# Patient Record
Sex: MALE | NOT HISPANIC OR LATINO | Employment: FULL TIME | URBAN - METROPOLITAN AREA
[De-identification: names, ages, dates, MRNs, and addresses within clinical notes are randomized per-mention and may not be internally consistent; named-entity substitution may affect disease eponyms.]

---

## 2024-01-09 DIAGNOSIS — K75.81 LIVER CIRRHOSIS SECONDARY TO NASH (H): ICD-10-CM

## 2024-01-09 DIAGNOSIS — R16.0 LIVER MASS: Primary | ICD-10-CM

## 2024-01-09 DIAGNOSIS — K74.60 LIVER CIRRHOSIS SECONDARY TO NASH (H): ICD-10-CM

## 2024-01-09 NOTE — CONFIDENTIAL NOTE
DIAGNOSIS:  Liver Mass   Appt Date:  01.16.2024   NOTES STATUS DETAILS   OFFICE NOTE from referring provider     OFFICE NOTES from other specialists Care Everywhere 05.04.2023 Tyson Rucker MD [facility]    01.30.2023  Eliazar Seaman MD [facility]   DISCHARGE SUMMARY from hospital     MEDICATION LIST Care Everywhere    LIVER BIOSPY (IF APPLICABLE)      PATHOLOGY REPORTS      IMAGING     ENDOSCOPY (IF AVAILABLE)     COLONOSCOPY (IF AVAILABLE)     ULTRASOUND LIVER     CT OF ABDOMEN     MRI OF LIVER Care Everywhere 01.08.2024, 07.10.2023, 04.11.2023 MR Abd W/WO    04.11.2023 MR Liver Limited    FIBROSCAN, US ELASTOGRAPHY, FIBROSIS SCAN, MR ELASTOGRAPHY Care Everywhere  10.03.2022 US Elastography   LABS     HEPATIC PANEL (LIVER PANEL) Care Everywhere 05.04.2023   BASIC METABOLIC PANEL Care Everywhere 05.04.2023   COMPLETE METABOLIC PANEL Care Everywhere 01.30.2023   COMPLETE BLOOD COUNT (CBC) Care Everywhere 01.30.2023   INTERNATIONAL NORMALIZED RATIO (INR) Care Everywhere 05.04.2023   HEPATITIS C ANTIBODY Care Everywhere 09.26.2022   HEPATITIS C VIRAL LOAD/PCR     HEPATITIS C GENOTYPE     HEPATITIS B SURFACE ANTIGEN Care Everywhere 09.26.2022   HEPATITIS B SURFACE ANTIBODY Care Everywhere 09.26.2022   HEPATITIS B DNA QUANT LEVEL     HEPATITIS B CORE ANTIBODY       Action 01.12.2024 JEN    Action Taken All images received and uploaded to chart.

## 2024-01-14 DIAGNOSIS — K74.69 OTHER CIRRHOSIS OF LIVER (H): Primary | ICD-10-CM

## 2024-01-14 NOTE — PROGRESS NOTES
[facility] Hepatology    New Patient Visit    Referring provider:  No ref. provider found  Chief complaint:  LR-4 Liver Lesion    Assessment  73 year old male with PMHx of compensated MASLD cirrhosis, HTN, type II diabetes and class III obesity.     #. MASLD Cirrhosis, compensated  #. LI-4, 2.4 cm   MELD 3.0: 9  Patient with compensated cirrhosis related to metabolic associated steatotic liver disease.  Hepatitis B and C studies are negative.  He has multiple metabolic risk factors including hypertension, type 2 diabetes and class III obesity with a BMI greater than 40.  He was noted to have an MRI that demonstrated a 2.4 cm LI-RADS 4 lesion in segment 4A and a LI-RADS 3 lesion in segment 8, which raises concern for hepatocellular carcinoma.  CT chest without any disease in the chest.  AFP within normal limits.    Patient with a platelet count of less than 100.  Total bilirubin 1.5. ECOG 0. JOSE ARMANDO 0.62.  Only prior abdominal surgery was a laparoscopic bilateral inguinal hernia repair in February 2023.    The majority of the visit was spent discussing his liver lesions and possibilities for therapies as well as next steps.    Plan  -- Plan to discuss the patient's imaging at liver tumor conference on 1/19/2024; will consider possible liver biopsy and ablation.  Given platelet count of less than 100 and BMI greater than 40, resection may be more challenging  -- Follow-up with primary care doctor regarding metabolic syndrome management.  He would benefit from a 7 to 10% weight loss to reduce his risk of decompensation.  Can consider nutrition referral per primary care doctor.  -- Given diabetes no contraindication to statin initiation given his risk of coronary artery disease.  -- Variceal Screening next due 12/2024    RTC: 3 months    Gabriela Borrego MD (Lizzie)  Advanced & Transplant Hepatology  [facility]    I spent 60 minutes on this encounter performing the following: reviewing the  patient's medical record (clinic visits, hospital records, lab results, imaging and procedural documentation), history taking, physical exam and documentation on the date of the encounter. I also spent part of the time in coordination of care and counseling.      HPI:  MASLD Cirrhosis  - no hx HE  - no hx ascites  - no hx variceal bleed  - last EGD 12/2022 - no varices    Liver Lesions  * 1/2024: LR-4 lesion (2.4cm) in seg 4A + LR-3 lesion (1.1 cm) in seg 8    Presented with his wife Radha.    He reports no signs or symptoms of decompensation.  He has no history of lethargy or confusion.  He has no history of lower extremity edema or abdominal swelling requiring a paracentesis.  He has no history of melena, hematochezia or hematemesis.    He otherwise continues to work full-time and travels.  The patient was previously on 2 medications for diabetes but is now down to metformin alone.      Medical hx Surgical hx   Past Medical History:   Diagnosis Date    HTN (hypertension)     MASLD Cirrhosis     Obesity     Type 2 diabetes mellitus (H)       Past Surgical History:   Procedure Laterality Date    Robotic assisted laparoscopic bilateral inguinal hernia repair with mesh      2/2023    TONSILLECTOMY            Medications  Current Outpatient Medications   Medication Sig Dispense Refill    albuterol (PROAIR HFA/PROVENTIL HFA/VENTOLIN HFA) 108 (90 Base) MCG/ACT inhaler Inhale 1-2 puffs into the lungs every 4 hours as needed      Ascorbic Acid (VITAMIN C PO) Take 1 tablet by mouth daily      EPINEPHrine (ANY BX GENERIC EQUIV) 0.3 MG/0.3ML injection 2-pack Inject 0.3 mg into the muscle as needed      finasteride (PROSCAR) 5 MG tablet Take 5 mg by mouth daily      fluticasone (FLONASE) 50 MCG/ACT nasal spray Spray 2 sprays into both nostrils daily as needed      fluticasone (FLOVENT HFA) 110 MCG/ACT inhaler Inhale 2 puffs into the lungs 2 times daily as needed      hydroxychloroquine (PLAQUENIL) 200 MG tablet Take 400 mg by  "mouth daily      lisinopril (ZESTRIL) 20 MG tablet Take 1 tablet by mouth daily      metFORMIN (GLUCOPHAGE) 500 MG tablet Take 500 mg by mouth daily (with breakfast)      Multiple Vitamin (MULTIVITAMIN PO) Take 1 tablet by mouth daily      tamsulosin (FLOMAX) 0.4 MG capsule Take 0.4 mg by mouth daily      vitamin B-Complex Take 1 tablet by mouth daily         Allergies  Allergies   Allergen Reactions    Tomato Anaphylaxis    Cephalexin Rash    Tetanus Toxoids Other (See Comments)     105 degree fever, swelling at site    Acyclovir Other (See Comments)     irritable, fragoso.    Ciprofloxacin Nausea and Vomiting       Family hx Social hx   Maternal grandfather may have had cirrhosis Social History     Tobacco Use    Smoking status: Former     Types: Cigarettes    Smokeless tobacco: Never    Tobacco comments:     Smoked in high school. Has not smoked in 50+ years   Substance Use Topics    Alcohol use: Not Currently    Drug use: Not Currently     - Employment: works as a   - Lives with Radha (wife). Several children + 5 grandchildren  - Alcohol: Denies  - Tobacco: Former, none in > 50 years     Review of systems  A 10-point review of systems was negative.    Examination  /73 (BP Location: Right arm, Patient Position: Sitting, Cuff Size: Adult Large)   Pulse 63   Temp 97.9  F (36.6  C) (Oral)   Resp 16   Ht 1.74 m (5' 8.5\")   Wt 121.4 kg (267 lb 11.2 oz)   SpO2 96%   BMI 40.11 kg/m     Body mass index is 40.11 kg/m .    Gen-NAD  Eye- EOMI  ENT- MMM  CVS- RRR, no murmurs  RS- CTA bilaterally  Abd-obese, soft, nontender.  Extr- 2+ radial pulses bilaterally, no lower extremity edema bilaterally  MS- hands without clubbing  Neuro- A+Ox3, no asterixis  Skin- no jaundice  Psych- normal mood    Laboratory  BMP  Recent Labs   Lab Test 01/16/24  0859      POTASSIUM 4.5   CHLORIDE 106   KIMBERLY 8.9   CO2 27   BUN 13.2   CR 0.77   *     CBC  Recent Labs   Lab Test 01/16/24  0859   WBC 3.6*   RBC 3.87* "   HGB 13.1*   HCT 37.3*   MCV 96   MCH 33.9*   MCHC 35.1   RDW 13.0   PLT 83*     Liver Enzymes   Recent Labs   Lab Test 01/16/24  0859   PROTTOTAL 6.6   ALBUMIN 3.6   BILITOTAL 1.5*   ALKPHOS 101   AST 40   ALT 16      INR   INR   Date Value Ref Range Status   01/16/2024 1.15 0.85 - 1.15 Final         Labs 5/2023  Total bilirubin 1.4 (0.7), AST 36, ALT 18, albumin 3.4, ALP 74  INR: 1.1    Ceruloplasmin: 23  Hep B s Ag non-reactive  Hep B s Ab non-reactive  Hep B c Ab non-reactive  Hep  C Ab negative     Radiology  CT Chest 1/10/2024  1. No CT findings to suggest metastatic disease in the chest.    MRI Abdomen 1/8/2024  1a. Mildly nodular hepatic contour, consistent with history of cirrhosis.   1b. Multiple arterially enhancing lesions throughout the liver without corresponding washout or pseudocapsule formation. Of these lesions, there is slightly increased, ill-defined arterially enhancing area within hepatic segment 4A measuring up to 2.4 cm, LI-RADS 4. Additional slightly increased 11 mm lesion within hepatic segment 8, LI-RADS 3.   2. Mild splenomegaly.   3. Scattered small T2 hyperintense pancreatic cysts. Attention on follow-up.    Endoscopy  EGD 12/2022  - LA Grade A reflux esophagitis with no bleeding.  - Z-line, 41 cm from the incisors.  - Mild Gastritis. Biopsied for h.pylori.   - Normal examined duodenum.   - The examination was otherwise normal.

## 2024-01-16 ENCOUNTER — PRE VISIT (OUTPATIENT)
Dept: GASTROENTEROLOGY | Facility: CLINIC | Age: 74
End: 2024-01-16

## 2024-01-16 ENCOUNTER — OFFICE VISIT (OUTPATIENT)
Dept: GASTROENTEROLOGY | Facility: CLINIC | Age: 74
End: 2024-01-16
Attending: INTERNAL MEDICINE
Payer: MEDICARE

## 2024-01-16 ENCOUNTER — LAB (OUTPATIENT)
Dept: LAB | Facility: CLINIC | Age: 74
End: 2024-01-16
Attending: INTERNAL MEDICINE
Payer: MEDICARE

## 2024-01-16 VITALS
RESPIRATION RATE: 16 BRPM | SYSTOLIC BLOOD PRESSURE: 124 MMHG | WEIGHT: 267.7 LBS | BODY MASS INDEX: 39.65 KG/M2 | OXYGEN SATURATION: 96 % | DIASTOLIC BLOOD PRESSURE: 73 MMHG | HEART RATE: 63 BPM | TEMPERATURE: 97.9 F | HEIGHT: 69 IN

## 2024-01-16 DIAGNOSIS — R16.0 LIVER MASS: ICD-10-CM

## 2024-01-16 DIAGNOSIS — K74.60 LIVER CIRRHOSIS SECONDARY TO NASH (H): Primary | ICD-10-CM

## 2024-01-16 DIAGNOSIS — K74.60 LIVER CIRRHOSIS SECONDARY TO NASH (H): ICD-10-CM

## 2024-01-16 DIAGNOSIS — K76.0 NAFLD (NONALCOHOLIC FATTY LIVER DISEASE): ICD-10-CM

## 2024-01-16 DIAGNOSIS — K74.69 OTHER CIRRHOSIS OF LIVER (H): ICD-10-CM

## 2024-01-16 DIAGNOSIS — E66.813 CLASS 3 SEVERE OBESITY DUE TO EXCESS CALORIES WITH BODY MASS INDEX (BMI) OF 40.0 TO 44.9 IN ADULT, UNSPECIFIED WHETHER SERIOUS COMORBIDITY PRESENT (H): ICD-10-CM

## 2024-01-16 DIAGNOSIS — K75.81 LIVER CIRRHOSIS SECONDARY TO NASH (H): Primary | ICD-10-CM

## 2024-01-16 DIAGNOSIS — E66.01 CLASS 3 SEVERE OBESITY DUE TO EXCESS CALORIES WITH BODY MASS INDEX (BMI) OF 40.0 TO 44.9 IN ADULT, UNSPECIFIED WHETHER SERIOUS COMORBIDITY PRESENT (H): ICD-10-CM

## 2024-01-16 DIAGNOSIS — K75.81 LIVER CIRRHOSIS SECONDARY TO NASH (H): ICD-10-CM

## 2024-01-16 LAB
ALBUMIN SERPL BCG-MCNC: 3.6 G/DL (ref 3.5–5.2)
ALP SERPL-CCNC: 101 U/L (ref 40–150)
ALT SERPL W P-5'-P-CCNC: 16 U/L (ref 0–70)
ANION GAP SERPL CALCULATED.3IONS-SCNC: 8 MMOL/L (ref 7–15)
AST SERPL W P-5'-P-CCNC: 40 U/L (ref 0–45)
BILIRUB DIRECT SERPL-MCNC: 0.53 MG/DL (ref 0–0.3)
BILIRUB SERPL-MCNC: 1.5 MG/DL
BUN SERPL-MCNC: 13.2 MG/DL (ref 8–23)
CALCIUM SERPL-MCNC: 8.9 MG/DL (ref 8.8–10.2)
CHLORIDE SERPL-SCNC: 106 MMOL/L (ref 98–107)
CREAT SERPL-MCNC: 0.77 MG/DL (ref 0.67–1.17)
DEPRECATED HCO3 PLAS-SCNC: 27 MMOL/L (ref 22–29)
EGFRCR SERPLBLD CKD-EPI 2021: >90 ML/MIN/1.73M2
ERYTHROCYTE [DISTWIDTH] IN BLOOD BY AUTOMATED COUNT: 13 % (ref 10–15)
GLUCOSE SERPL-MCNC: 119 MG/DL (ref 70–99)
HCT VFR BLD AUTO: 37.3 % (ref 40–53)
HGB BLD-MCNC: 13.1 G/DL (ref 13.3–17.7)
INR PPP: 1.15 (ref 0.85–1.15)
MCH RBC QN AUTO: 33.9 PG (ref 26.5–33)
MCHC RBC AUTO-ENTMCNC: 35.1 G/DL (ref 31.5–36.5)
MCV RBC AUTO: 96 FL (ref 78–100)
PLATELET # BLD AUTO: 83 10E3/UL (ref 150–450)
POTASSIUM SERPL-SCNC: 4.5 MMOL/L (ref 3.4–5.3)
PROT SERPL-MCNC: 6.6 G/DL (ref 6.4–8.3)
RBC # BLD AUTO: 3.87 10E6/UL (ref 4.4–5.9)
SODIUM SERPL-SCNC: 141 MMOL/L (ref 135–145)
WBC # BLD AUTO: 3.6 10E3/UL (ref 4–11)

## 2024-01-16 PROCEDURE — 85027 COMPLETE CBC AUTOMATED: CPT | Performed by: PATHOLOGY

## 2024-01-16 PROCEDURE — 36415 COLL VENOUS BLD VENIPUNCTURE: CPT | Performed by: PATHOLOGY

## 2024-01-16 PROCEDURE — 82248 BILIRUBIN DIRECT: CPT | Performed by: PATHOLOGY

## 2024-01-16 PROCEDURE — G0480 DRUG TEST DEF 1-7 CLASSES: HCPCS | Mod: 90 | Performed by: PATHOLOGY

## 2024-01-16 PROCEDURE — 99205 OFFICE O/P NEW HI 60 MIN: CPT | Performed by: INTERNAL MEDICINE

## 2024-01-16 PROCEDURE — 85610 PROTHROMBIN TIME: CPT | Performed by: PATHOLOGY

## 2024-01-16 PROCEDURE — 99000 SPECIMEN HANDLING OFFICE-LAB: CPT | Performed by: PATHOLOGY

## 2024-01-16 PROCEDURE — 82105 ALPHA-FETOPROTEIN SERUM: CPT | Performed by: INTERNAL MEDICINE

## 2024-01-16 PROCEDURE — 80053 COMPREHEN METABOLIC PANEL: CPT | Performed by: PATHOLOGY

## 2024-01-16 PROCEDURE — G0463 HOSPITAL OUTPT CLINIC VISIT: HCPCS | Performed by: INTERNAL MEDICINE

## 2024-01-16 RX ORDER — ALBUTEROL SULFATE 90 UG/1
1-2 AEROSOL, METERED RESPIRATORY (INHALATION) EVERY 4 HOURS PRN
COMMUNITY
Start: 2023-05-01

## 2024-01-16 RX ORDER — FLUTICASONE PROPIONATE 110 UG/1
2 AEROSOL, METERED RESPIRATORY (INHALATION) 2 TIMES DAILY PRN
COMMUNITY
Start: 2023-05-01

## 2024-01-16 RX ORDER — FLUTICASONE PROPIONATE 50 MCG
2 SPRAY, SUSPENSION (ML) NASAL DAILY PRN
COMMUNITY
Start: 2022-02-21

## 2024-01-16 RX ORDER — EPINEPHRINE 0.3 MG/.3ML
0.3 INJECTION SUBCUTANEOUS PRN
COMMUNITY
Start: 2023-05-01

## 2024-01-16 RX ORDER — LISINOPRIL 20 MG/1
1 TABLET ORAL DAILY
COMMUNITY
Start: 2023-03-15 | End: 2024-07-19

## 2024-01-16 RX ORDER — FINASTERIDE 5 MG/1
5 TABLET, FILM COATED ORAL DAILY
COMMUNITY
Start: 2023-06-27 | End: 2024-06-26

## 2024-01-16 RX ORDER — HYDROXYCHLOROQUINE SULFATE 200 MG/1
400 TABLET, FILM COATED ORAL DAILY
COMMUNITY
Start: 2023-05-01

## 2024-01-16 RX ORDER — TAMSULOSIN HYDROCHLORIDE 0.4 MG/1
0.4 CAPSULE ORAL DAILY
COMMUNITY
Start: 2023-06-27 | End: 2024-06-26

## 2024-01-16 ASSESSMENT — PAIN SCALES - GENERAL: PAINLEVEL: NO PAIN (0)

## 2024-01-16 NOTE — NURSING NOTE
"Chief Complaint   Patient presents with    Consult     Elevated bilirubin, abnormal liver imaging      Vital signs:  Temp: 97.9  F (36.6  C) Temp src: Oral BP: 124/73 Pulse: 63   Resp: 16 SpO2: 96 %     Height: 174 cm (5' 8.5\") (pt reported) Weight: 121.4 kg (267 lb 11.2 oz)  Estimated body mass index is 40.11 kg/m  as calculated from the following:    Height as of this encounter: 1.74 m (5' 8.5\").    Weight as of this encounter: 121.4 kg (267 lb 11.2 oz).      Lana Cerrato, [facility]  1/16/2024 10:14 AM    "

## 2024-01-16 NOTE — LETTER
1/16/2024         RE: Stewart Cummings  [address] [facility] [address]        Dear Colleague,    Thank you for referring your patient, Stewart Cummings, to the [facility]PAT[facility] [place]. Please see a copy of my visit note below.    [facility] Hepatology    New Patient Visit    Referring provider:  No ref. provider found  Chief complaint:  LR-4 Liver Lesion    Assessment  73 year old male with PMHx of compensated MASLD cirrhosis, HTN, type II diabetes and class III obesity.     #. MASLD Cirrhosis, compensated  #. LI-4, 2.4 cm   MELD 3.0: 9  Patient with compensated cirrhosis related to metabolic associated steatotic liver disease.  Hepatitis B and C studies are negative.  He has multiple metabolic risk factors including hypertension, type 2 diabetes and class III obesity with a BMI greater than 40.  He was noted to have an MRI that demonstrated a 2.4 cm LI-RADS 4 lesion in segment 4A and a LI-RADS 3 lesion in segment 8, which raises concern for hepatocellular carcinoma.  CT chest without any disease in the chest.  AFP within normal limits.    Patient with a platelet count of less than 100.  Total bilirubin 1.5. ECOG 0. JOSE ARMANDO 0.62.  Only prior abdominal surgery was a laparoscopic bilateral inguinal hernia repair in February 2023.    The majority of the visit was spent discussing his liver lesions and possibilities for therapies as well as next steps.    Plan  -- Plan to discuss the patient's imaging at liver tumor conference on 1/19/2024; will consider possible liver biopsy and ablation.  Given platelet count of less than 100 and BMI greater than 40, resection may be more challenging  -- Follow-up with primary care doctor regarding metabolic syndrome management.  He would benefit from a 7 to 10% weight loss to reduce his risk of decompensation.  Can consider nutrition referral per primary care doctor.  -- Given diabetes no contraindication to statin initiation given his  risk of coronary artery disease.  -- Variceal Screening next due 12/2024    RTC: 3 months    Gabriela Borrego MD (Lizzie)  Advanced & Transplant Hepatology  [facility]    I spent 60 minutes on this encounter performing the following: reviewing the patient's medical record (clinic visits, hospital records, lab results, imaging and procedural documentation), history taking, physical exam and documentation on the date of the encounter. I also spent part of the time in coordination of care and counseling.      HPI:  MASLD Cirrhosis  - no hx HE  - no hx ascites  - no hx variceal bleed  - last EGD 12/2022 - no varices    Liver Lesions  * 1/2024: LR-4 lesion (2.4cm) in seg 4A + LR-3 lesion (1.1 cm) in seg 8    Presented with his wife Radha.    He reports no signs or symptoms of decompensation.  He has no history of lethargy or confusion.  He has no history of lower extremity edema or abdominal swelling requiring a paracentesis.  He has no history of melena, hematochezia or hematemesis.    He otherwise continues to work full-time and travels.  The patient was previously on 2 medications for diabetes but is now down to metformin alone.      Medical hx Surgical hx   Past Medical History:   Diagnosis Date     HTN (hypertension)      MASLD Cirrhosis      Obesity      Type 2 diabetes mellitus (H)       Past Surgical History:   Procedure Laterality Date     Robotic assisted laparoscopic bilateral inguinal hernia repair with mesh      2/2023     TONSILLECTOMY            Medications  Current Outpatient Medications   Medication Sig Dispense Refill     albuterol (PROAIR HFA/PROVENTIL HFA/VENTOLIN HFA) 108 (90 Base) MCG/ACT inhaler Inhale 1-2 puffs into the lungs every 4 hours as needed       Ascorbic Acid (VITAMIN C PO) Take 1 tablet by mouth daily       EPINEPHrine (ANY BX GENERIC EQUIV) 0.3 MG/0.3ML injection 2-pack Inject 0.3 mg into the muscle as needed       finasteride (PROSCAR) 5 MG tablet Take 5 mg by  "mouth daily       fluticasone (FLONASE) 50 MCG/ACT nasal spray Spray 2 sprays into both nostrils daily as needed       fluticasone (FLOVENT HFA) 110 MCG/ACT inhaler Inhale 2 puffs into the lungs 2 times daily as needed       hydroxychloroquine (PLAQUENIL) 200 MG tablet Take 400 mg by mouth daily       lisinopril (ZESTRIL) 20 MG tablet Take 1 tablet by mouth daily       metFORMIN (GLUCOPHAGE) 500 MG tablet Take 500 mg by mouth daily (with breakfast)       Multiple Vitamin (MULTIVITAMIN PO) Take 1 tablet by mouth daily       tamsulosin (FLOMAX) 0.4 MG capsule Take 0.4 mg by mouth daily       vitamin B-Complex Take 1 tablet by mouth daily         Allergies  Allergies   Allergen Reactions     Tomato Anaphylaxis     Cephalexin Rash     Tetanus Toxoids Other (See Comments)     105 degree fever, swelling at site     Acyclovir Other (See Comments)     irritable, fragoso.     Ciprofloxacin Nausea and Vomiting       Family hx Social hx   Maternal grandfather may have had cirrhosis Social History     Tobacco Use     Smoking status: Former     Types: Cigarettes     Smokeless tobacco: Never     Tobacco comments:     Smoked in high school. Has not smoked in 50+ years   Substance Use Topics     Alcohol use: Not Currently     Drug use: Not Currently     - Employment: works as a   - Lives with Radha (wife). Several children + 5 grandchildren  - Alcohol: Denies  - Tobacco: Former, none in > 50 years     Review of systems  A 10-point review of systems was negative.    Examination  /73 (BP Location: Right arm, Patient Position: Sitting, Cuff Size: Adult Large)   Pulse 63   Temp 97.9  F (36.6  C) (Oral)   Resp 16   Ht 1.74 m (5' 8.5\")   Wt 121.4 kg (267 lb 11.2 oz)   SpO2 96%   BMI 40.11 kg/m     Body mass index is 40.11 kg/m .    Gen-NAD  Eye- EOMI  ENT- MMM  CVS- RRR, no murmurs  RS- CTA bilaterally  Abd-obese, soft, nontender.  Extr- 2+ radial pulses bilaterally, no lower extremity edema bilaterally  MS- hands " without clubbing  Neuro- A+Ox3, no asterixis  Skin- no jaundice  Psych- normal mood    Laboratory  BMP  Recent Labs   Lab Test 01/16/24  0859      POTASSIUM 4.5   CHLORIDE 106   KIMBERLY 8.9   CO2 27   BUN 13.2   CR 0.77   *     CBC  Recent Labs   Lab Test 01/16/24  0859   WBC 3.6*   RBC 3.87*   HGB 13.1*   HCT 37.3*   MCV 96   MCH 33.9*   MCHC 35.1   RDW 13.0   PLT 83*     Liver Enzymes   Recent Labs   Lab Test 01/16/24  0859   PROTTOTAL 6.6   ALBUMIN 3.6   BILITOTAL 1.5*   ALKPHOS 101   AST 40   ALT 16      INR   INR   Date Value Ref Range Status   01/16/2024 1.15 0.85 - 1.15 Final         Labs 5/2023  Total bilirubin 1.4 (0.7), AST 36, ALT 18, albumin 3.4, ALP 74  INR: 1.1    Ceruloplasmin: 23  Hep B s Ag non-reactive  Hep B s Ab non-reactive  Hep B c Ab non-reactive  Hep  C Ab negative     Radiology  CT Chest 1/10/2024  1. No CT findings to suggest metastatic disease in the chest.    MRI Abdomen 1/8/2024  1a. Mildly nodular hepatic contour, consistent with history of cirrhosis.   1b. Multiple arterially enhancing lesions throughout the liver without corresponding washout or pseudocapsule formation. Of these lesions, there is slightly increased, ill-defined arterially enhancing area within hepatic segment 4A measuring up to 2.4 cm, LI-RADS 4. Additional slightly increased 11 mm lesion within hepatic segment 8, LI-RADS 3.   2. Mild splenomegaly.   3. Scattered small T2 hyperintense pancreatic cysts. Attention on follow-up.    Endoscopy  EGD 12/2022  - LA Grade A reflux esophagitis with no bleeding.  - Z-line, 41 cm from the incisors.  - Mild Gastritis. Biopsied for h.pylori.   - Normal examined duodenum.   - The examination was otherwise normal.        Again, thank you for allowing me to participate in the care of your patient.        Sincerely,        Gabriela Borrego MD

## 2024-01-18 LAB
AFP SERPL-MCNC: 4.1 NG/ML
LABORATORY REPORT: NORMAL
PETH INTERPRETATION: NORMAL
PLPETH BLD-MCNC: <10 NG/ML
POPETH BLD-MCNC: <10 NG/ML

## 2024-01-19 ENCOUNTER — TELEPHONE (OUTPATIENT)
Dept: MULTI SPECIALTY CLINIC | Facility: CLINIC | Age: 74
End: 2024-01-19
Payer: MEDICARE

## 2024-01-26 ENCOUNTER — ONCOLOGY VISIT (OUTPATIENT)
Dept: RADIOLOGY | Facility: CLINIC | Age: 74
End: 2024-01-26
Attending: RADIOLOGY
Payer: MEDICARE

## 2024-01-26 ENCOUNTER — HOSPITAL ENCOUNTER (OUTPATIENT)
Dept: GENERAL RADIOLOGY | Facility: CLINIC | Age: 74
Discharge: HOME | End: 2024-01-26
Attending: RADIOLOGY
Payer: MEDICARE

## 2024-01-26 VITALS
WEIGHT: 261.3 LBS | OXYGEN SATURATION: 98 % | DIASTOLIC BLOOD PRESSURE: 71 MMHG | RESPIRATION RATE: 18 BRPM | TEMPERATURE: 97.8 F | SYSTOLIC BLOOD PRESSURE: 127 MMHG | BODY MASS INDEX: 39.15 KG/M2 | HEART RATE: 81 BPM

## 2024-01-26 DIAGNOSIS — R16.0 LIVER MASS: ICD-10-CM

## 2024-01-26 DIAGNOSIS — K74.60 LIVER CIRRHOSIS SECONDARY TO NASH (H): ICD-10-CM

## 2024-01-26 DIAGNOSIS — K75.81 LIVER CIRRHOSIS SECONDARY TO NASH (H): ICD-10-CM

## 2024-01-26 PROCEDURE — 74183 MRI ABD W/O CNTR FLWD CNTR: CPT | Mod: 26 | Performed by: RADIOLOGY

## 2024-01-26 PROCEDURE — 99204 OFFICE O/P NEW MOD 45 MIN: CPT | Mod: GC | Performed by: RADIOLOGY

## 2024-01-26 PROCEDURE — G0463 HOSPITAL OUTPT CLINIC VISIT: HCPCS | Mod: 25 | Performed by: RADIOLOGY

## 2024-01-26 PROCEDURE — 999N000122 MR OUTSIDE READ

## 2024-01-26 ASSESSMENT — PAIN SCALES - GENERAL: PAINLEVEL: NO PAIN (0)

## 2024-01-26 NOTE — PROGRESS NOTES
Interventional Radiology Clinic Visit    Date of this visit: 1/26/2024    Stewart Cummings  is referred by Dr. Gabriela Borrego for treatment recommendations. The patient requires evaluation for diagnosis of hepatocellular carcinoma (HCC).     Primary Physician: Eliazar Seaman        History Of Present Illness:    Stewart Cummings is a 74 yo male with past medical history of compensated MASLD cirrhosis, T2DM, and obesity, presenting today for new LI-RADS 4 lesion in segment 4A and LI-RADS 3 lesion in segment 8. His HCC tumor has been deemed unresectable at liver tumor conference.    He is doing well generally. His understanding is that we will be talking about potentially treating his segment 4A lesion with radioembolization. No abdominal pain, pruritus, or jaundice. He has had a cataract surgery a couple days ago, and he feels back to normal again. He did not have any issues with anesthesia or recovery. He has cataract surgery for his right eye scheduled in the beginning of February, and expects that his recovery will be similar.     He is a  and works to [employer] for over [employer], and travels all over the country for this.       Review of Systems:    As above.    Past Medical/Surgical History:    Past Medical History:   Diagnosis Date    HTN (hypertension)     MASLD Cirrhosis     Obesity     Type 2 diabetes mellitus (H)      Past Surgical History:   Procedure Laterality Date    Robotic assisted laparoscopic bilateral inguinal hernia repair with mesh      2/2023    TONSILLECTOMY         Current Medications:    Current Outpatient Medications   Medication Sig Dispense Refill    albuterol (PROAIR HFA/PROVENTIL HFA/VENTOLIN HFA) 108 (90 Base) MCG/ACT inhaler Inhale 1-2 puffs into the lungs every 4 hours as needed      Ascorbic Acid (VITAMIN C PO) Take 1 tablet by mouth daily      finasteride (PROSCAR) 5 MG tablet Take 5 mg by mouth daily      fluticasone (FLONASE) 50 MCG/ACT nasal spray Spray 2  sprays into both nostrils daily as needed      fluticasone (FLOVENT HFA) 110 MCG/ACT inhaler Inhale 2 puffs into the lungs 2 times daily as needed      hydroxychloroquine (PLAQUENIL) 200 MG tablet Take 400 mg by mouth daily      lisinopril (ZESTRIL) 20 MG tablet Take 1 tablet by mouth daily      metFORMIN (GLUCOPHAGE) 500 MG tablet Take 500 mg by mouth daily (with breakfast)      Multiple Vitamin (MULTIVITAMIN PO) Take 1 tablet by mouth daily      tamsulosin (FLOMAX) 0.4 MG capsule Take 0.4 mg by mouth daily      vitamin B-Complex Take 1 tablet by mouth daily      EPINEPHrine (ANY BX GENERIC EQUIV) 0.3 MG/0.3ML injection 2-pack Inject 0.3 mg into the muscle as needed (Patient not taking: Reported on 1/26/2024)         Allergies:    Tomato, Cephalexin, Tetanus toxoids, Acyclovir, and Ciprofloxacin    Family History:    No family history on file.    Social History:    Social History     Socioeconomic History    Marital status:      Spouse name: None    Number of children: None    Years of education: None    Highest education level: None   Tobacco Use    Smoking status: Former     Types: Cigarettes    Smokeless tobacco: Never    Tobacco comments:     Smoked in high school. Has not smoked in 50+ years   Substance and Sexual Activity    Alcohol use: Not Currently    Drug use: Not Currently       Physical Exam:    /71   Pulse 81   Temp 97.8  F (36.6  C) (Oral)   Resp 18   Wt 118.5 kg (261 lb 4.8 oz)   SpO2 98%   BMI 39.15 kg/m       Constitutional:       Appearance: Healthy appearance. Well dressed.  HENT:      Head: Normocephalic.   Eyes:      Extraocular Movements: Extraocular movements intact.   Pulmonary:      Effort: Pulmonary effort is normal.   Neurological:      General: No focal deficit present.      Mental Status: He is alert.   Psychiatric:         Mood and Affect: Mood normal.     Laboratory Studies:    Lab Results   Component Value Date    HGB 13.1 01/16/2024     Lab Results   Component  Value Date    PLT 83 01/16/2024     Lab Results   Component Value Date    WBC 3.6 01/16/2024       Lab Results   Component Value Date    INR 1.15 01/16/2024       Lab Results   Component Value Date    PROTTOTAL 6.6 01/16/2024      Lab Results   Component Value Date    ALBUMIN 3.6 01/16/2024     Lab Results   Component Value Date    BILITOTAL 1.5 01/16/2024     Lab Results   Component Value Date    ALKPHOS 101 01/16/2024     Lab Results   Component Value Date    AST 40 01/16/2024     Lab Results   Component Value Date    ALT 16 01/16/2024       Lab Results   Component Value Date    CR 0.77 01/16/2024     AFP:  4.1 (1/16/2024)    Imaging:     I reviewed the MR abdomen from 1/8/2024. There is an arterially enhancing lesion of segment 4a measuring 2.8 cm without washout or pseudocapsule. The lesion demonstrates diffusion restriction, LI-RADS 4. There is also an arterially enhancing lesion of segment 8, measuring 1.1 cm without washout, pseudocapsule, or diffusion restriction.    ASSESSMENT/PLAN:     Stewart Cummings is a 74 yo male with past medical history of compensated MASLD cirrhosis, T2DM, and obesity, presenting today for new unresectable HCC lesion in segment 4 abutting the hilum, with the location not ideal for ablation. His case was discussed in tumor conference, and the recommendation is to treat the segment 4A lesion with Y90.     Child-Smith score: A5  ECOG performance status: 0    I showed the patient the imaging and discussed the findings. I discussed with them that the goal of the procedure is disease control with a survival benefit rather than a cure given the nature of the disease, but that sometimes lesions will be cured in this manner. Alternatives were reviewed, including surgery, but the patient is not a surgical candidate.    We discussed that the Y90 transarterial radioembolization is a two step procedure. The first step is a mapping procedure, where we go in through the artery in the groin or the  wrist, into the hepatic artery and take images, to map out the supply to the tumor. During this procedure MAA is administered as a dummy particle to see where the particles would travel to, and to measure the lung shunt. After this procedure, the dose to treat his tumor is calculated. Additionally, if his anatomy was such that we find arteries feeding structures that we do not want particles to go to, we may embolize those arteries with coils or bland particles. Then a few days to a week later, he comes back for a similar procedure, but instead of injecting MAA, the Y90 particles will be administered from the vessel that we selected from the mapping procedure. Both procedures are outpatient procedures done under moderate sedation. He would require 2-6 hours of bedrest after the procedure, then he would be able to go home. I explained that both procedures are performed under conscious sedation. We discussed what will happen before, during and after the procedure; what to expect in the post procedure recovery period; and what the follow-up will be. We discussed post-radioembolization side effects which consists of varying degrees of pain, nausea, and lethargy. We discussed the risks of the procedure which include, but are not limited to, vascular injury causing bleeding or occlusion of blood vessels, groin hematoma, infection, liver failure, non-target radiation injury to structures such as gallbladder/bowel/pancreas/lung.  We also discussed that the treatment sometimes need more than one treatment session to gain control of the tumor, particularly for large or multifocal tumors, that may involve other modalities including ablation.  I also explained that new tumors may develop elsewhere given the nature of the disease. Most patients go home the same day, but occasionally circumstances are such that a longer admission may be required. All of the patient's questions were answered and they agreed to proceed.     We also  discussed that because of his cirrhosis and the fact that he has a tumor already, he is at a higher risk for other HCC lesions growing in the future and he will be closely monitored for this.    He would like to think about whether he wants to proceed with treatment, and we will contact him next week regarding his decision.         It was a pleasure seeing the patient.     Signed,  Cassy Ramírez DO  PGY5    Co-signed,  Crissy Krause M.D.    Interventional Radiology  Department of Radiology  [facility]  Patient Care Team:  Eliazar Seaman MD as PCP - General (Pediatrics)  RUBIN SHAH, Dr Crissy Krause, was present with the fellow during the entire clinic visit, including the history and exam. I discussed the case with the fellow and agree with the findings as documented in the assessment and plan.         50 minutes spent by me on the date of the encounter doing chart review, history and exam, imaging review, documentation and further activities per the note.

## 2024-01-26 NOTE — LETTER
1/26/2024         RE: Stewart Cummings  [address] [address] [address]        Dear Colleague,    Thank you for referring your patient, Stewart Cummings, to the [facility]. Please see a copy of my visit note below.          Interventional Radiology Clinic Visit    Date of this visit: 1/26/2024    Stewart Cummings  is referred by Dr. Gabriela Borrego for treatment recommendations. The patient requires evaluation for diagnosis of hepatocellular carcinoma (HCC).     Primary Physician: Eliazar Seaman        History Of Present Illness:    Stewart Cummings is a 72 yo male with past medical history of compensated MASLD cirrhosis, T2DM, and obesity, presenting today for new LI-RADS 4 lesion in segment 4A and LI-RADS 3 lesion in segment 8. His HCC tumor has been deemed unresectable at liver tumor conference.    He is doing well generally. His understanding is that we will be talking about potentially treating his segment 4A lesion with radioembolization. No abdominal pain, pruritus, or jaundice. He has had a cataract surgery a couple days ago, and he feels back to normal again. He did not have any issues with anesthesia or recovery. He has cataract surgery for his right eye scheduled in the beginning of February, and expects that his recovery will be similar.     He is a  and works to [employer] for over [employer], and travels all over the country for this.       Review of Systems:    As above.    Past Medical/Surgical History:    Past Medical History:   Diagnosis Date    HTN (hypertension)     MASLD Cirrhosis     Obesity     Type 2 diabetes mellitus (H)      Past Surgical History:   Procedure Laterality Date    Robotic assisted laparoscopic bilateral inguinal hernia repair with mesh      2/2023    TONSILLECTOMY         Current Medications:    Current Outpatient Medications   Medication Sig Dispense Refill    albuterol (PROAIR HFA/PROVENTIL HFA/VENTOLIN HFA) 108 (90 Base)  MCG/ACT inhaler Inhale 1-2 puffs into the lungs every 4 hours as needed      Ascorbic Acid (VITAMIN C PO) Take 1 tablet by mouth daily      finasteride (PROSCAR) 5 MG tablet Take 5 mg by mouth daily      fluticasone (FLONASE) 50 MCG/ACT nasal spray Spray 2 sprays into both nostrils daily as needed      fluticasone (FLOVENT HFA) 110 MCG/ACT inhaler Inhale 2 puffs into the lungs 2 times daily as needed      hydroxychloroquine (PLAQUENIL) 200 MG tablet Take 400 mg by mouth daily      lisinopril (ZESTRIL) 20 MG tablet Take 1 tablet by mouth daily      metFORMIN (GLUCOPHAGE) 500 MG tablet Take 500 mg by mouth daily (with breakfast)      Multiple Vitamin (MULTIVITAMIN PO) Take 1 tablet by mouth daily      tamsulosin (FLOMAX) 0.4 MG capsule Take 0.4 mg by mouth daily      vitamin B-Complex Take 1 tablet by mouth daily      EPINEPHrine (ANY BX GENERIC EQUIV) 0.3 MG/0.3ML injection 2-pack Inject 0.3 mg into the muscle as needed (Patient not taking: Reported on 1/26/2024)         Allergies:    Tomato, Cephalexin, Tetanus toxoids, Acyclovir, and Ciprofloxacin    Family History:    No family history on file.    Social History:    Social History     Socioeconomic History    Marital status:      Spouse name: None    Number of children: None    Years of education: None    Highest education level: None   Tobacco Use    Smoking status: Former     Types: Cigarettes    Smokeless tobacco: Never    Tobacco comments:     Smoked in high school. Has not smoked in 50+ years   Substance and Sexual Activity    Alcohol use: Not Currently    Drug use: Not Currently       Physical Exam:    /71   Pulse 81   Temp 97.8  F (36.6  C) (Oral)   Resp 18   Wt 118.5 kg (261 lb 4.8 oz)   SpO2 98%   BMI 39.15 kg/m       Constitutional:       Appearance: Healthy appearance. Well dressed.  HENT:      Head: Normocephalic.   Eyes:      Extraocular Movements: Extraocular movements intact.   Pulmonary:      Effort: Pulmonary effort is normal.    Neurological:      General: No focal deficit present.      Mental Status: He is alert.   Psychiatric:         Mood and Affect: Mood normal.     Laboratory Studies:    Lab Results   Component Value Date    HGB 13.1 01/16/2024     Lab Results   Component Value Date    PLT 83 01/16/2024     Lab Results   Component Value Date    WBC 3.6 01/16/2024       Lab Results   Component Value Date    INR 1.15 01/16/2024       Lab Results   Component Value Date    PROTTOTAL 6.6 01/16/2024      Lab Results   Component Value Date    ALBUMIN 3.6 01/16/2024     Lab Results   Component Value Date    BILITOTAL 1.5 01/16/2024     Lab Results   Component Value Date    ALKPHOS 101 01/16/2024     Lab Results   Component Value Date    AST 40 01/16/2024     Lab Results   Component Value Date    ALT 16 01/16/2024       Lab Results   Component Value Date    CR 0.77 01/16/2024     AFP:  4.1 (1/16/2024)    Imaging:     I reviewed the MR abdomen from 1/8/2024. There is an arterially enhancing lesion of segment 4a measuring 2.8 cm without washout or pseudocapsule. The lesion demonstrates diffusion restriction, LI-RADS 4. There is also an arterially enhancing lesion of segment 8, measuring 1.1 cm without washout, pseudocapsule, or diffusion restriction.    ASSESSMENT/PLAN:     Stewart Cummings is a 74 yo male with past medical history of compensated MASLD cirrhosis, T2DM, and obesity, presenting today for new unresectable HCC lesion in segment 4 abutting the hilum, with the location not ideal for ablation. His case was discussed in tumor conference, and the recommendation is to treat the segment 4A lesion with Y90.     Child-Smith score: A5  ECOG performance status: 0    I showed the patient the imaging and discussed the findings. I discussed with them that the goal of the procedure is disease control with a survival benefit rather than a cure given the nature of the disease, but that sometimes lesions will be cured in this manner. Alternatives  were reviewed, including surgery, but the patient is not a surgical candidate.    We discussed that the Y90 transarterial radioembolization is a two step procedure. The first step is a mapping procedure, where we go in through the artery in the groin or the wrist, into the hepatic artery and take images, to map out the supply to the tumor. During this procedure MAA is administered as a dummy particle to see where the particles would travel to, and to measure the lung shunt. After this procedure, the dose to treat his tumor is calculated. Additionally, if his anatomy was such that we find arteries feeding structures that we do not want particles to go to, we may embolize those arteries with coils or bland particles. Then a few days to a week later, he comes back for a similar procedure, but instead of injecting MAA, the Y90 particles will be administered from the vessel that we selected from the mapping procedure. Both procedures are outpatient procedures done under moderate sedation. He would require 2-6 hours of bedrest after the procedure, then he would be able to go home. I explained that both procedures are performed under conscious sedation. We discussed what will happen before, during and after the procedure; what to expect in the post procedure recovery period; and what the follow-up will be. We discussed post-radioembolization side effects which consists of varying degrees of pain, nausea, and lethargy. We discussed the risks of the procedure which include, but are not limited to, vascular injury causing bleeding or occlusion of blood vessels, groin hematoma, infection, liver failure, non-target radiation injury to structures such as gallbladder/bowel/pancreas/lung.  We also discussed that the treatment sometimes need more than one treatment session to gain control of the tumor, particularly for large or multifocal tumors, that may involve other modalities including ablation.  I also explained that new  tumors may develop elsewhere given the nature of the disease. Most patients go home the same day, but occasionally circumstances are such that a longer admission may be required. All of the patient's questions were answered and they agreed to proceed.     We also discussed that because of his cirrhosis and the fact that he has a tumor already, he is at a higher risk for other HCC lesions growing in the future and he will be closely monitored for this.    He would like to think about whether he wants to proceed with treatment, and we will contact him next week regarding his decision.         It was a pleasure seeing the patient.     Signed,  Cassy Ramírez DO  PGY5    Co-signed,  Crissy Krause M.D.    Interventional Radiology  Department of Radiology  [facility]  Patient Care Team:  Eliazar Seaman MD as PCP - General (Pediatrics)  RUBIN SHAH, Dr Crissy Krause, was present with the fellow during the entire clinic visit, including the history and exam. I discussed the case with the fellow and agree with the findings as documented in the assessment and plan.         50 minutes spent by me on the date of the encounter doing chart review, history and exam, imaging review, documentation and further activities per the note.

## 2024-01-26 NOTE — NURSING NOTE
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    Weight as of this encounter: 118.5 kg (261 lb 4.8 oz). Body surface area is 2.39 meters squared.  No Pain (0) Comment: Data Unavailable   No LMP for male patient.  Allergies reviewed: Yes  Medications reviewed: Yes    Medications: Medication refills not needed today.  Pharmacy name entered into [facility]: [id number] - [place], [place] - [address]    Frailty Screening:   Is the patient here for a new oncology consult visit in cancer care? 2. No      Clinical concerns: None       Lily Luna CMA            "

## 2024-02-06 ENCOUNTER — MYC MEDICAL ADVICE (OUTPATIENT)
Dept: GASTROENTEROLOGY | Facility: CLINIC | Age: 74
End: 2024-02-06
Payer: MEDICARE

## 2024-02-20 ENCOUNTER — HOSPITAL ENCOUNTER (OUTPATIENT)
Facility: CLINIC | Age: 74
Discharge: HOME | End: 2024-02-20
Attending: RADIOLOGY | Admitting: RADIOLOGY
Payer: MEDICARE

## 2024-02-20 ENCOUNTER — HOSPITAL ENCOUNTER (OUTPATIENT)
Dept: NUCLEAR MEDICINE | Facility: CLINIC | Age: 74
Setting detail: NUCLEAR MEDICINE
Discharge: HOME | End: 2024-02-20
Attending: RADIOLOGY | Admitting: RADIOLOGY
Payer: MEDICARE

## 2024-02-20 ENCOUNTER — APPOINTMENT (OUTPATIENT)
Dept: INTERVENTIONAL RADIOLOGY/VASCULAR | Facility: CLINIC | Age: 74
End: 2024-02-20
Attending: RADIOLOGY
Payer: MEDICARE

## 2024-02-20 ENCOUNTER — APPOINTMENT (OUTPATIENT)
Dept: MEDSURG UNIT | Facility: CLINIC | Age: 74
End: 2024-02-20
Attending: RADIOLOGY
Payer: MEDICARE

## 2024-02-20 VITALS
OXYGEN SATURATION: 96 % | TEMPERATURE: 97.9 F | BODY MASS INDEX: 39.23 KG/M2 | DIASTOLIC BLOOD PRESSURE: 68 MMHG | RESPIRATION RATE: 11 BRPM | WEIGHT: 261.8 LBS | SYSTOLIC BLOOD PRESSURE: 123 MMHG | HEART RATE: 64 BPM

## 2024-02-20 DIAGNOSIS — R16.0 LIVER MASS: ICD-10-CM

## 2024-02-20 DIAGNOSIS — K75.81 LIVER CIRRHOSIS SECONDARY TO NASH (H): ICD-10-CM

## 2024-02-20 DIAGNOSIS — K74.60 LIVER CIRRHOSIS SECONDARY TO NASH (H): ICD-10-CM

## 2024-02-20 LAB
ALBUMIN SERPL BCG-MCNC: 3.8 G/DL (ref 3.5–5.2)
ALP SERPL-CCNC: 111 U/L (ref 40–150)
ALT SERPL W P-5'-P-CCNC: 13 U/L (ref 0–70)
AST SERPL W P-5'-P-CCNC: 36 U/L (ref 0–45)
BILIRUB DIRECT SERPL-MCNC: 0.54 MG/DL (ref 0–0.3)
BILIRUB SERPL-MCNC: 1.4 MG/DL
CREAT SERPL-MCNC: 0.71 MG/DL (ref 0.67–1.17)
EGFRCR SERPLBLD CKD-EPI 2021: >90 ML/MIN/1.73M2
ERYTHROCYTE [DISTWIDTH] IN BLOOD BY AUTOMATED COUNT: 12.8 % (ref 10–15)
HCT VFR BLD AUTO: 39.4 % (ref 40–53)
HGB BLD-MCNC: 13.8 G/DL (ref 13.3–17.7)
INR PPP: 1.16 (ref 0.85–1.15)
MCH RBC QN AUTO: 34.1 PG (ref 26.5–33)
MCHC RBC AUTO-ENTMCNC: 35 G/DL (ref 31.5–36.5)
MCV RBC AUTO: 97 FL (ref 78–100)
PLATELET # BLD AUTO: 106 10E3/UL (ref 150–450)
PROT SERPL-MCNC: 6.8 G/DL (ref 6.4–8.3)
RBC # BLD AUTO: 4.05 10E6/UL (ref 4.4–5.9)
WBC # BLD AUTO: 4.3 10E3/UL (ref 4–11)

## 2024-02-20 PROCEDURE — 82565 ASSAY OF CREATININE: CPT

## 2024-02-20 PROCEDURE — 999N000142 HC STATISTIC PROCEDURE PREP ONLY

## 2024-02-20 PROCEDURE — 75726 ARTERY X-RAYS ABDOMEN: CPT | Mod: XU

## 2024-02-20 PROCEDURE — 82040 ASSAY OF SERUM ALBUMIN: CPT

## 2024-02-20 PROCEDURE — C1769 GUIDE WIRE: HCPCS

## 2024-02-20 PROCEDURE — 999N000134 HC STATISTIC PP CARE STAGE 3

## 2024-02-20 PROCEDURE — G1010 CDSM STANSON: HCPCS | Performed by: RADIOLOGY

## 2024-02-20 PROCEDURE — 36247 INS CATH ABD/L-EXT ART 3RD: CPT | Mod: GC | Performed by: RADIOLOGY

## 2024-02-20 PROCEDURE — 258N000003 HC RX IP 258 OP 636

## 2024-02-20 PROCEDURE — C1887 CATHETER, GUIDING: HCPCS

## 2024-02-20 PROCEDURE — 78830 RP LOCLZJ TUM SPECT W/CT 1: CPT | Mod: MG

## 2024-02-20 PROCEDURE — A9540 TC99M MAA: HCPCS | Performed by: RADIOLOGY

## 2024-02-20 PROCEDURE — 36415 COLL VENOUS BLD VENIPUNCTURE: CPT

## 2024-02-20 PROCEDURE — 85027 COMPLETE CBC AUTOMATED: CPT

## 2024-02-20 PROCEDURE — 99152 MOD SED SAME PHYS/QHP 5/>YRS: CPT

## 2024-02-20 PROCEDURE — 85610 PROTHROMBIN TIME: CPT

## 2024-02-20 PROCEDURE — 343N000001 HC RX 343: Performed by: RADIOLOGY

## 2024-02-20 PROCEDURE — 255N000002 HC RX 255 OP 636: Performed by: RADIOLOGY

## 2024-02-20 PROCEDURE — 272N000504 HC NEEDLE CR4

## 2024-02-20 PROCEDURE — 36248 INS CATH ABD/L-EXT ART ADDL: CPT

## 2024-02-20 PROCEDURE — 77300 RADIATION THERAPY DOSE PLAN: CPT | Mod: 26 | Performed by: RADIOLOGY

## 2024-02-20 PROCEDURE — 76380 CAT SCAN FOLLOW-UP STUDY: CPT | Mod: 26 | Performed by: RADIOLOGY

## 2024-02-20 PROCEDURE — G1010 CDSM STANSON: HCPCS

## 2024-02-20 PROCEDURE — 76937 US GUIDE VASCULAR ACCESS: CPT

## 2024-02-20 PROCEDURE — 272N000143 HC KIT CR3

## 2024-02-20 PROCEDURE — 78830 RP LOCLZJ TUM SPECT W/CT 1: CPT | Mod: 26 | Performed by: RADIOLOGY

## 2024-02-20 PROCEDURE — 75726 ARTERY X-RAYS ABDOMEN: CPT | Mod: 26 | Performed by: RADIOLOGY

## 2024-02-20 PROCEDURE — 76937 US GUIDE VASCULAR ACCESS: CPT | Mod: 26 | Performed by: RADIOLOGY

## 2024-02-20 PROCEDURE — 250N000011 HC RX IP 250 OP 636: Performed by: STUDENT IN AN ORGANIZED HEALTH CARE EDUCATION/TRAINING PROGRAM

## 2024-02-20 PROCEDURE — 77263 THER RADIOLOGY TX PLNG CPLX: CPT | Mod: GC | Performed by: RADIOLOGY

## 2024-02-20 PROCEDURE — 250N000009 HC RX 250

## 2024-02-20 PROCEDURE — 75774 ARTERY X-RAY EACH VESSEL: CPT | Mod: 26 | Performed by: RADIOLOGY

## 2024-02-20 RX ORDER — NALOXONE HYDROCHLORIDE 0.4 MG/ML
0.4 INJECTION, SOLUTION INTRAMUSCULAR; INTRAVENOUS; SUBCUTANEOUS
Status: DISCONTINUED | OUTPATIENT
Start: 2024-02-20 | End: 2024-02-20 | Stop reason: HOSPADM

## 2024-02-20 RX ORDER — HEPARIN SODIUM 200 [USP'U]/100ML
3 INJECTION, SOLUTION INTRAVENOUS CONTINUOUS PRN
Status: DISCONTINUED | OUTPATIENT
Start: 2024-02-20 | End: 2024-02-20 | Stop reason: HOSPADM

## 2024-02-20 RX ORDER — SODIUM CHLORIDE 9 MG/ML
INJECTION, SOLUTION INTRAVENOUS CONTINUOUS
Status: DISCONTINUED | OUTPATIENT
Start: 2024-02-20 | End: 2024-02-20 | Stop reason: HOSPADM

## 2024-02-20 RX ORDER — AMPICILLIN AND SULBACTAM 2; 1 G/1; G/1
3 INJECTION, POWDER, FOR SOLUTION INTRAMUSCULAR; INTRAVENOUS
Status: DISCONTINUED | OUTPATIENT
Start: 2024-02-20 | End: 2024-02-20

## 2024-02-20 RX ORDER — NALOXONE HYDROCHLORIDE 0.4 MG/ML
0.2 INJECTION, SOLUTION INTRAMUSCULAR; INTRAVENOUS; SUBCUTANEOUS
Status: DISCONTINUED | OUTPATIENT
Start: 2024-02-20 | End: 2024-02-20 | Stop reason: HOSPADM

## 2024-02-20 RX ORDER — FENTANYL CITRATE 50 UG/ML
25-50 INJECTION, SOLUTION INTRAMUSCULAR; INTRAVENOUS EVERY 5 MIN PRN
Status: DISCONTINUED | OUTPATIENT
Start: 2024-02-20 | End: 2024-02-20 | Stop reason: HOSPADM

## 2024-02-20 RX ORDER — LIDOCAINE 40 MG/G
CREAM TOPICAL
Status: DISCONTINUED | OUTPATIENT
Start: 2024-02-20 | End: 2024-02-20 | Stop reason: HOSPADM

## 2024-02-20 RX ORDER — ONDANSETRON 2 MG/ML
4 INJECTION INTRAMUSCULAR; INTRAVENOUS ONCE
Status: DISCONTINUED | OUTPATIENT
Start: 2024-02-20 | End: 2024-02-20 | Stop reason: HOSPADM

## 2024-02-20 RX ORDER — IODIXANOL 320 MG/ML
100 INJECTION, SOLUTION INTRAVASCULAR ONCE
Status: COMPLETED | OUTPATIENT
Start: 2024-02-20 | End: 2024-02-20

## 2024-02-20 RX ORDER — FLUMAZENIL 0.1 MG/ML
0.2 INJECTION, SOLUTION INTRAVENOUS
Status: DISCONTINUED | OUTPATIENT
Start: 2024-02-20 | End: 2024-02-20 | Stop reason: HOSPADM

## 2024-02-20 RX ADMIN — FENTANYL CITRATE 50 MCG: 50 INJECTION, SOLUTION INTRAMUSCULAR; INTRAVENOUS at 14:51

## 2024-02-20 RX ADMIN — IODIXANOL 100 ML: 320 INJECTION, SOLUTION INTRAVASCULAR at 16:27

## 2024-02-20 RX ADMIN — FENTANYL CITRATE 50 MCG: 50 INJECTION, SOLUTION INTRAMUSCULAR; INTRAVENOUS at 14:23

## 2024-02-20 RX ADMIN — MIDAZOLAM 1 MG: 1 INJECTION INTRAMUSCULAR; INTRAVENOUS at 15:15

## 2024-02-20 RX ADMIN — FENTANYL CITRATE 50 MCG: 50 INJECTION, SOLUTION INTRAMUSCULAR; INTRAVENOUS at 15:15

## 2024-02-20 RX ADMIN — MIDAZOLAM 1 MG: 1 INJECTION INTRAMUSCULAR; INTRAVENOUS at 14:51

## 2024-02-20 RX ADMIN — KIT FOR THE PREPARATION OF TECHNETIUM TC 99M ALBUMIN AGGREGATED 3.5 MILLICURIE: 2.5 INJECTION, POWDER, FOR SOLUTION INTRAVENOUS at 16:54

## 2024-02-20 RX ADMIN — LIDOCAINE HYDROCHLORIDE 10 ML: 10 INJECTION, SOLUTION EPIDURAL; INFILTRATION; INTRACAUDAL; PERINEURAL at 14:36

## 2024-02-20 RX ADMIN — MIDAZOLAM 1 MG: 1 INJECTION INTRAMUSCULAR; INTRAVENOUS at 15:58

## 2024-02-20 RX ADMIN — HEPARIN SODIUM 3 BAG: 200 INJECTION, SOLUTION INTRAVENOUS at 14:46

## 2024-02-20 RX ADMIN — MIDAZOLAM 1 MG: 1 INJECTION INTRAMUSCULAR; INTRAVENOUS at 14:33

## 2024-02-20 RX ADMIN — FENTANYL CITRATE 50 MCG: 50 INJECTION, SOLUTION INTRAMUSCULAR; INTRAVENOUS at 15:58

## 2024-02-20 RX ADMIN — FENTANYL CITRATE 50 MCG: 50 INJECTION, SOLUTION INTRAMUSCULAR; INTRAVENOUS at 14:33

## 2024-02-20 RX ADMIN — MIDAZOLAM 1 MG: 1 INJECTION INTRAMUSCULAR; INTRAVENOUS at 15:32

## 2024-02-20 RX ADMIN — SODIUM CHLORIDE: 9 INJECTION, SOLUTION INTRAVENOUS at 12:51

## 2024-02-20 RX ADMIN — MIDAZOLAM 1 MG: 1 INJECTION INTRAMUSCULAR; INTRAVENOUS at 14:23

## 2024-02-20 RX ADMIN — FENTANYL CITRATE 50 MCG: 50 INJECTION, SOLUTION INTRAMUSCULAR; INTRAVENOUS at 15:32

## 2024-02-20 ASSESSMENT — ACTIVITIES OF DAILY LIVING (ADL)
ADLS_ACUITY_SCORE: 35

## 2024-02-20 NOTE — IR NOTE
Patient Name: Stewart Cummings  Medical Record Number: [id number]  Today's Date: 2/20/2024    Procedure: Y90 Mapping  Proceduralist: Dr. Desiree Penn  Pathology present: na    Procedure Start: 1430  Procedure end: 1620  Sedation medications administered:   Fentanyl 300 mcg  Versed 6 mg  Sedation time: 117 minutes (1423 - 1620)     Report given to:  Rn  : shweta    Other Notes: Pt arrived to IR room 1 from . Consent reviewed. Pt denies any questions or concerns regarding procedure. Pt positioned supine and monitored per protocol.     Pt tolerated procedure without any noted complications.     Right Groin Access;  Angioseal deployed at 1618.  Bedrest x 2 hours.  Ambulation time: 1818    Pt transferred back to .

## 2024-02-20 NOTE — PRE-PROCEDURE
GENERAL PRE-PROCEDURE:   Procedure:  Visceral angiogram for y90 mapping with possible embolization    Written consent obtained?: Yes    Risks and benefits: Risks, benefits and alternatives were discussed    Consent given by:  Patient  Patient states understanding of procedure being performed: Yes    Patient's understanding of procedure matches consent: Yes    Procedure consent matches procedure scheduled: Yes    Expected level of sedation:  Moderate  Appropriately NPO:  Yes  ASA Class:  1  Mallampati  :  Grade 2- soft palate, base of uvula, tonsillar pillars, and portion of posterior pharyngeal wall visible  Lungs:  Lungs clear with good breath sounds bilaterally  Heart:  Normal heart sounds and rate  History & Physical reviewed:  History and physical reviewed and no updates needed  Statement of review:  I have reviewed the lab findings, diagnostic data, medications, and the plan for sedation

## 2024-02-20 NOTE — PROGRESS NOTES
Writer spoke with IR Charge RN regarding patient's procedure. Writer confirmed Y-90 mapping; not delivery. No pre procedure medications needed.

## 2024-02-20 NOTE — PROGRESS NOTES
Pt arrived via cart with RN from IR s/p y-90 mapping. VSS. Right site CDI, no hematoma. Patient denies pain.  Wife and daughter at bedside. Flat bedrest until 1820 per MD orders. Patient brought down to nuclear medicine for scan.

## 2024-02-20 NOTE — PROCEDURES
[facility]    Procedure: IR Procedure Note    Date/Time: 2/20/2024 4:22 PM    Performed by: Cassy Ramírez DO  Authorized by: Crissy Sainz MD      UNIVERSAL PROTOCOL   Site Marked: NA  Prior Images Obtained and Reviewed:  Yes  Required items: Required blood products, implants, devices and special equipment available    Patient identity confirmed:  Verbally with patient, arm band, provided demographic data and hospital-assigned identification number  Patient was reevaluated immediately before administering moderate or deep sedation or anesthesia  Confirmation Checklist:  Patient's identity using two indicators, relevant allergies, procedure was appropriate and matched the consent or emergent situation and correct equipment/implants were available  Time out: Immediately prior to the procedure a time out was called    Universal Protocol: the Joint Commission Universal Protocol was followed    Preparation: Patient was prepped and draped in usual sterile fashion    ESBL (mL):  5     ANESTHESIA    Anesthesia:  Local infiltration  Local Anesthetic:  Lidocaine 1% without epinephrine  Anesthetic Total (mL):  8      SEDATION  Patient Sedated: Yes    Sedation Type:  Moderate (conscious) sedation  Sedation:  Fentanyl and midazolam  Vital signs: Vital signs monitored during sedation    See dictated procedure note for full details.  Findings: Right groin puncture. Y90 mapping performed.    Specimens: none    Complications: None    Condition: Stable    Plan: -2 hour bedrest      PROCEDURE    Patient Tolerance:  Patient tolerated the procedure well with no immediate complications  Length of time physician/provider present for 1:1 monitoring during sedation: 120

## 2024-02-20 NOTE — DISCHARGE INSTRUCTIONS
[facility]   Interventional Radiology  Discharge Instructions Post Y-90 Mapping    AFTER YOU GO HOME          Relax and take it easy for 24 hours.       Drink plenty of fluids.       Resume your regular diet, unless otherwise instructed by your Primary Physician.       DO NOT smoke for at least 24 hours, if you were given any sedation.       DO NOT drink alcoholic beverages the day of your procedure.       DO NOT drive or operate machinery at home or at work for 24 hours.          DO NOT make any important or legal decisions for 24 hours following your procedure.       DO NOT take a shower for at least 12 hours following your procedure. No tub bath, hot tubs, or swimming for 5 days        Care of groin site  It is normal to have a small bruise or lump at the site.  For the first 2 days, when you cough, sneeze or move your bowels, hold your hand over the puncture site and press gently.  Do NOT lift more than 10 pounds or do any strenuous exercise for at least 3 to 5 days.  Do not use lotion or powder near the puncture site for 3 days.  If you start bleeding from the site in your groin: lie down flat and press firmly on the site. Call your doctor as soon as you can.   Call 911 right away if you have: Heavy bleeding, bleeding that does not stop.    CALL THE PHYSICIAN IF:       - You start bleeding from the procedure site. A small lump or bruise is common at the puncture site. Your physician will tell you if you need to return to the hospital.      - You develop numbness, coolness or a change in color of the leg that was punctured.      - You experience increased pain or redness at the puncture site.      - You develop hives or a rash or unexplained itching.      - You develop a temperature of 101 degrees F or greater.    Additional Information:      Closure Device: Angioseal booklet given            [facility] INTERVENTIONAL RADIOLOGY DEPARTMENT         Procedure Physician:Dr. Krause and Dr. Ramírez                               Date of Procedure:February 20, 2024       Telephone Numbers:   [phone]      Monday-Friday 7:30 am to 4:00 pm                                        [phone]     After 4:00 pm Monday-Friday, Weekend and Holidays. Ask for the Interventional Radiologist on call. Someone is on call 24 hrs/day.

## 2024-02-20 NOTE — PROGRESS NOTES
Prep complete for Y-90 mapping. VSS. Awaiting consent to be signed and labs to be resulted. Wife and daughter at bedside

## 2024-02-21 NOTE — PROGRESS NOTES
Condition is stable s/p Y-90 mapping. Vital Signs are stable/WNL. Right groin site clean, dry and intact, cms intact. Discharge instructions reviewed with patient and questions answered. Patient verbalizes understanding. IV removed. Pain under control. Patient is ambulating and voiding spontaneously. Patient is tolerating regular diet and denies any N/V. Patient to be discharged to home via family. Patient has all belongings

## 2024-02-23 ENCOUNTER — TELEPHONE (OUTPATIENT)
Dept: RADIOLOGY | Facility: CLINIC | Age: 74
End: 2024-02-23
Payer: MEDICARE

## 2024-02-23 NOTE — TELEPHONE ENCOUNTER
I called and spoke with Stewart. He is feeling well post Y90 mapping. Access site is without complications. Pt denies pain. Eating and drinking fine. Pt has Y90 delivery scheduled for March 4. Pt denies any question or concerns at this time.     Delfina Lai RN  Nurse Care Coordinator-Interventional Radiology  [phone]

## 2024-03-04 ENCOUNTER — APPOINTMENT (OUTPATIENT)
Dept: INTERVENTIONAL RADIOLOGY/VASCULAR | Facility: CLINIC | Age: 74
End: 2024-03-04
Attending: RADIOLOGY
Payer: MEDICARE

## 2024-03-04 ENCOUNTER — HOSPITAL ENCOUNTER (OUTPATIENT)
Facility: CLINIC | Age: 74
Discharge: HOME | End: 2024-03-04
Attending: RADIOLOGY | Admitting: RADIOLOGY
Payer: MEDICARE

## 2024-03-04 ENCOUNTER — HOSPITAL ENCOUNTER (OUTPATIENT)
Dept: NUCLEAR MEDICINE | Facility: CLINIC | Age: 74
Setting detail: NUCLEAR MEDICINE
Discharge: HOME | End: 2024-03-04
Attending: RADIOLOGY | Admitting: RADIOLOGY
Payer: MEDICARE

## 2024-03-04 ENCOUNTER — APPOINTMENT (OUTPATIENT)
Dept: MEDSURG UNIT | Facility: CLINIC | Age: 74
End: 2024-03-04
Payer: MEDICARE

## 2024-03-04 VITALS
WEIGHT: 260 LBS | DIASTOLIC BLOOD PRESSURE: 71 MMHG | HEART RATE: 72 BPM | BODY MASS INDEX: 38.96 KG/M2 | RESPIRATION RATE: 11 BRPM | SYSTOLIC BLOOD PRESSURE: 123 MMHG | OXYGEN SATURATION: 94 % | TEMPERATURE: 97.9 F

## 2024-03-04 DIAGNOSIS — K75.81 LIVER CIRRHOSIS SECONDARY TO NASH (H): ICD-10-CM

## 2024-03-04 DIAGNOSIS — K74.60 LIVER CIRRHOSIS SECONDARY TO NASH (H): ICD-10-CM

## 2024-03-04 DIAGNOSIS — R16.0 LIVER MASS: ICD-10-CM

## 2024-03-04 LAB
ALBUMIN SERPL BCG-MCNC: 3.7 G/DL (ref 3.5–5.2)
ALP SERPL-CCNC: 99 U/L (ref 40–150)
ALT SERPL W P-5'-P-CCNC: 11 U/L (ref 0–70)
ANION GAP SERPL CALCULATED.3IONS-SCNC: 9 MMOL/L (ref 7–15)
AST SERPL W P-5'-P-CCNC: 37 U/L (ref 0–45)
BILIRUB DIRECT SERPL-MCNC: 0.55 MG/DL (ref 0–0.3)
BILIRUB SERPL-MCNC: 1.6 MG/DL
BUN SERPL-MCNC: 12.1 MG/DL (ref 8–23)
CALCIUM SERPL-MCNC: 8.7 MG/DL (ref 8.8–10.2)
CHLORIDE SERPL-SCNC: 101 MMOL/L (ref 98–107)
CREAT SERPL-MCNC: 0.84 MG/DL (ref 0.67–1.17)
DEPRECATED HCO3 PLAS-SCNC: 25 MMOL/L (ref 22–29)
EGFRCR SERPLBLD CKD-EPI 2021: >90 ML/MIN/1.73M2
ERYTHROCYTE [DISTWIDTH] IN BLOOD BY AUTOMATED COUNT: 12.8 % (ref 10–15)
GLUCOSE SERPL-MCNC: 122 MG/DL (ref 70–99)
HCT VFR BLD AUTO: 37.1 % (ref 40–53)
HGB BLD-MCNC: 13.1 G/DL (ref 13.3–17.7)
INR PPP: 1.13 (ref 0.85–1.15)
MCH RBC QN AUTO: 33.9 PG (ref 26.5–33)
MCHC RBC AUTO-ENTMCNC: 35.3 G/DL (ref 31.5–36.5)
MCV RBC AUTO: 96 FL (ref 78–100)
PLATELET # BLD AUTO: 102 10E3/UL (ref 150–450)
POTASSIUM SERPL-SCNC: 4.1 MMOL/L (ref 3.4–5.3)
PROT SERPL-MCNC: 6.6 G/DL (ref 6.4–8.3)
RBC # BLD AUTO: 3.87 10E6/UL (ref 4.4–5.9)
SODIUM SERPL-SCNC: 135 MMOL/L (ref 135–145)
WBC # BLD AUTO: 3.9 10E3/UL (ref 4–11)

## 2024-03-04 PROCEDURE — 37243 VASC EMBOLIZE/OCCLUDE ORGAN: CPT

## 2024-03-04 PROCEDURE — C1887 CATHETER, GUIDING: HCPCS

## 2024-03-04 PROCEDURE — 272N000143 HC KIT CR3

## 2024-03-04 PROCEDURE — 250N000011 HC RX IP 250 OP 636: Performed by: STUDENT IN AN ORGANIZED HEALTH CARE EDUCATION/TRAINING PROGRAM

## 2024-03-04 PROCEDURE — 36246 INS CATH ABD/L-EXT ART 2ND: CPT

## 2024-03-04 PROCEDURE — C1769 GUIDE WIRE: HCPCS

## 2024-03-04 PROCEDURE — 36415 COLL VENOUS BLD VENIPUNCTURE: CPT | Performed by: NURSE PRACTITIONER

## 2024-03-04 PROCEDURE — 78800 RP LOCLZJ TUM 1 AREA 1 D IMG: CPT | Mod: 26 | Performed by: RADIOLOGY

## 2024-03-04 PROCEDURE — C9113 INJ PANTOPRAZOLE SODIUM, VIA: HCPCS | Performed by: NURSE PRACTITIONER

## 2024-03-04 PROCEDURE — C1760 CLOSURE DEV, VASC: HCPCS

## 2024-03-04 PROCEDURE — 99152 MOD SED SAME PHYS/QHP 5/>YRS: CPT

## 2024-03-04 PROCEDURE — 93005 ELECTROCARDIOGRAM TRACING: CPT

## 2024-03-04 PROCEDURE — 999N000054 HC STATISTIC EKG NON-CHARGEABLE

## 2024-03-04 PROCEDURE — 85027 COMPLETE CBC AUTOMATED: CPT | Performed by: NURSE PRACTITIONER

## 2024-03-04 PROCEDURE — G1010 CDSM STANSON: HCPCS | Performed by: RADIOLOGY

## 2024-03-04 PROCEDURE — 36247 INS CATH ABD/L-EXT ART 3RD: CPT | Mod: GC | Performed by: RADIOLOGY

## 2024-03-04 PROCEDURE — 99152 MOD SED SAME PHYS/QHP 5/>YRS: CPT | Mod: GC | Performed by: RADIOLOGY

## 2024-03-04 PROCEDURE — 250N000009 HC RX 250: Performed by: STUDENT IN AN ORGANIZED HEALTH CARE EDUCATION/TRAINING PROGRAM

## 2024-03-04 PROCEDURE — 999N000134 HC STATISTIC PP CARE STAGE 3

## 2024-03-04 PROCEDURE — 79445 NUCLEAR RX INTRA-ARTERIAL: CPT | Mod: 26 | Performed by: RADIOLOGY

## 2024-03-04 PROCEDURE — 37243 VASC EMBOLIZE/OCCLUDE ORGAN: CPT | Mod: GC | Performed by: RADIOLOGY

## 2024-03-04 PROCEDURE — 85610 PROTHROMBIN TIME: CPT | Performed by: NURSE PRACTITIONER

## 2024-03-04 PROCEDURE — 75774 ARTERY X-RAY EACH VESSEL: CPT | Mod: XU

## 2024-03-04 PROCEDURE — 75726 ARTERY X-RAYS ABDOMEN: CPT | Mod: XU

## 2024-03-04 PROCEDURE — 272N000504 HC NEEDLE CR4

## 2024-03-04 PROCEDURE — 78800 RP LOCLZJ TUM 1 AREA 1 D IMG: CPT | Mod: MG

## 2024-03-04 PROCEDURE — 999N000142 HC STATISTIC PROCEDURE PREP ONLY

## 2024-03-04 PROCEDURE — 278N000001 HC RX 278: Performed by: RADIOLOGY

## 2024-03-04 PROCEDURE — 272N000566 HC SHEATH CR3

## 2024-03-04 PROCEDURE — 258N000003 HC RX IP 258 OP 636: Performed by: NURSE PRACTITIONER

## 2024-03-04 PROCEDURE — 80048 BASIC METABOLIC PNL TOTAL CA: CPT | Performed by: NURSE PRACTITIONER

## 2024-03-04 PROCEDURE — 36247 INS CATH ABD/L-EXT ART 3RD: CPT

## 2024-03-04 PROCEDURE — 82248 BILIRUBIN DIRECT: CPT | Performed by: NURSE PRACTITIONER

## 2024-03-04 PROCEDURE — 250N000011 HC RX IP 250 OP 636: Performed by: NURSE PRACTITIONER

## 2024-03-04 PROCEDURE — 76937 US GUIDE VASCULAR ACCESS: CPT | Mod: 26 | Performed by: RADIOLOGY

## 2024-03-04 PROCEDURE — C2616 BRACHYTX, NON-STR,YTTRIUM-90: HCPCS | Performed by: RADIOLOGY

## 2024-03-04 PROCEDURE — 255N000002 HC RX 255 OP 636: Performed by: RADIOLOGY

## 2024-03-04 PROCEDURE — 80053 COMPREHEN METABOLIC PANEL: CPT | Performed by: NURSE PRACTITIONER

## 2024-03-04 RX ORDER — IODIXANOL 320 MG/ML
50 INJECTION, SOLUTION INTRAVASCULAR ONCE
Status: COMPLETED | OUTPATIENT
Start: 2024-03-04 | End: 2024-03-04

## 2024-03-04 RX ORDER — FLUMAZENIL 0.1 MG/ML
0.2 INJECTION, SOLUTION INTRAVENOUS
Status: DISCONTINUED | OUTPATIENT
Start: 2024-03-04 | End: 2024-03-04 | Stop reason: HOSPADM

## 2024-03-04 RX ORDER — ONDANSETRON 2 MG/ML
4 INJECTION INTRAMUSCULAR; INTRAVENOUS ONCE
Status: COMPLETED | OUTPATIENT
Start: 2024-03-04 | End: 2024-03-04

## 2024-03-04 RX ORDER — NALOXONE HYDROCHLORIDE 0.4 MG/ML
0.4 INJECTION, SOLUTION INTRAMUSCULAR; INTRAVENOUS; SUBCUTANEOUS
Status: DISCONTINUED | OUTPATIENT
Start: 2024-03-04 | End: 2024-03-04 | Stop reason: HOSPADM

## 2024-03-04 RX ORDER — LIDOCAINE HYDROCHLORIDE 10 MG/ML
1-30 INJECTION, SOLUTION EPIDURAL; INFILTRATION; INTRACAUDAL; PERINEURAL
Status: COMPLETED | OUTPATIENT
Start: 2024-03-04 | End: 2024-03-04

## 2024-03-04 RX ORDER — SODIUM CHLORIDE 9 MG/ML
INJECTION, SOLUTION INTRAVENOUS CONTINUOUS
Status: DISCONTINUED | OUTPATIENT
Start: 2024-03-04 | End: 2024-03-04 | Stop reason: HOSPADM

## 2024-03-04 RX ORDER — HYDROMORPHONE HYDROCHLORIDE 2 MG/1
2 TABLET ORAL EVERY 4 HOURS PRN
Status: DISCONTINUED | OUTPATIENT
Start: 2024-03-04 | End: 2024-03-04 | Stop reason: HOSPADM

## 2024-03-04 RX ORDER — ACETAMINOPHEN 325 MG/1
650 TABLET ORAL EVERY 4 HOURS PRN
Status: DISCONTINUED | OUTPATIENT
Start: 2024-03-04 | End: 2024-03-04 | Stop reason: HOSPADM

## 2024-03-04 RX ORDER — NALOXONE HYDROCHLORIDE 0.4 MG/ML
0.2 INJECTION, SOLUTION INTRAMUSCULAR; INTRAVENOUS; SUBCUTANEOUS
Status: DISCONTINUED | OUTPATIENT
Start: 2024-03-04 | End: 2024-03-04 | Stop reason: HOSPADM

## 2024-03-04 RX ORDER — DIPHENHYDRAMINE HYDROCHLORIDE 50 MG/ML
25-50 INJECTION INTRAMUSCULAR; INTRAVENOUS
Status: COMPLETED | OUTPATIENT
Start: 2024-03-04 | End: 2024-03-04

## 2024-03-04 RX ORDER — FENTANYL CITRATE 50 UG/ML
25-50 INJECTION, SOLUTION INTRAMUSCULAR; INTRAVENOUS EVERY 5 MIN PRN
Status: DISCONTINUED | OUTPATIENT
Start: 2024-03-04 | End: 2024-03-04 | Stop reason: HOSPADM

## 2024-03-04 RX ORDER — ONDANSETRON 4 MG/1
4-8 TABLET, FILM COATED ORAL EVERY 6 HOURS PRN
Qty: 40 TABLET | Refills: 0 | Status: SHIPPED | OUTPATIENT
Start: 2024-03-04 | End: 2024-07-19

## 2024-03-04 RX ORDER — HEPARIN SODIUM 200 [USP'U]/100ML
1 INJECTION, SOLUTION INTRAVENOUS CONTINUOUS PRN
Status: DISCONTINUED | OUTPATIENT
Start: 2024-03-04 | End: 2024-03-04 | Stop reason: HOSPADM

## 2024-03-04 RX ORDER — LIDOCAINE 40 MG/G
CREAM TOPICAL
Status: DISCONTINUED | OUTPATIENT
Start: 2024-03-04 | End: 2024-03-04 | Stop reason: HOSPADM

## 2024-03-04 RX ORDER — NICOTINE POLACRILEX 4 MG
15-30 LOZENGE BUCCAL
Status: DISCONTINUED | OUTPATIENT
Start: 2024-03-04 | End: 2024-03-04 | Stop reason: HOSPADM

## 2024-03-04 RX ORDER — DEXTROSE MONOHYDRATE 25 G/50ML
25-50 INJECTION, SOLUTION INTRAVENOUS
Status: DISCONTINUED | OUTPATIENT
Start: 2024-03-04 | End: 2024-03-04 | Stop reason: HOSPADM

## 2024-03-04 RX ORDER — PANTOPRAZOLE SODIUM 40 MG/1
40 TABLET, DELAYED RELEASE ORAL DAILY
Qty: 30 TABLET | Refills: 0 | Status: SHIPPED | OUTPATIENT
Start: 2024-03-04

## 2024-03-04 RX ORDER — HYDROMORPHONE HYDROCHLORIDE 2 MG/1
4 TABLET ORAL EVERY 4 HOURS PRN
Status: DISCONTINUED | OUTPATIENT
Start: 2024-03-04 | End: 2024-03-04 | Stop reason: HOSPADM

## 2024-03-04 RX ORDER — METHYLPREDNISOLONE 4 MG
TABLET, DOSE PACK ORAL
Qty: 21 TABLET | Refills: 0 | Status: SHIPPED | OUTPATIENT
Start: 2024-03-05 | End: 2024-07-01

## 2024-03-04 RX ORDER — OXYCODONE HYDROCHLORIDE 5 MG/1
5-10 TABLET ORAL EVERY 6 HOURS PRN
Qty: 10 TABLET | Refills: 0 | Status: SHIPPED | OUTPATIENT
Start: 2024-03-04 | End: 2024-07-01

## 2024-03-04 RX ADMIN — MIDAZOLAM 1 MG: 1 INJECTION INTRAMUSCULAR; INTRAVENOUS at 08:40

## 2024-03-04 RX ADMIN — FENTANYL CITRATE 25 MCG: 50 INJECTION, SOLUTION INTRAMUSCULAR; INTRAVENOUS at 09:08

## 2024-03-04 RX ADMIN — Medication 30.4 MILLICURIE: at 10:49

## 2024-03-04 RX ADMIN — MIDAZOLAM 0.5 MG: 1 INJECTION INTRAMUSCULAR; INTRAVENOUS at 09:29

## 2024-03-04 RX ADMIN — FENTANYL CITRATE 25 MCG: 50 INJECTION, SOLUTION INTRAMUSCULAR; INTRAVENOUS at 09:29

## 2024-03-04 RX ADMIN — IODIXANOL 50 ML: 320 INJECTION, SOLUTION INTRAVASCULAR at 09:47

## 2024-03-04 RX ADMIN — ONDANSETRON 4 MG: 2 INJECTION INTRAMUSCULAR; INTRAVENOUS at 08:35

## 2024-03-04 RX ADMIN — HEPARIN SODIUM 2 BAG: 200 INJECTION, SOLUTION INTRAVENOUS at 08:59

## 2024-03-04 RX ADMIN — PANTOPRAZOLE SODIUM 40 MG: 40 INJECTION, POWDER, FOR SOLUTION INTRAVENOUS at 07:34

## 2024-03-04 RX ADMIN — LIDOCAINE HYDROCHLORIDE 5 ML: 10 INJECTION, SOLUTION EPIDURAL; INFILTRATION; INTRACAUDAL; PERINEURAL at 08:59

## 2024-03-04 RX ADMIN — FENTANYL CITRATE 25 MCG: 50 INJECTION, SOLUTION INTRAMUSCULAR; INTRAVENOUS at 08:48

## 2024-03-04 RX ADMIN — MIDAZOLAM 0.5 MG: 1 INJECTION INTRAMUSCULAR; INTRAVENOUS at 08:58

## 2024-03-04 RX ADMIN — DIPHENHYDRAMINE HYDROCHLORIDE 25 MG: 50 INJECTION, SOLUTION INTRAMUSCULAR; INTRAVENOUS at 08:55

## 2024-03-04 RX ADMIN — MIDAZOLAM 0.5 MG: 1 INJECTION INTRAMUSCULAR; INTRAVENOUS at 09:08

## 2024-03-04 RX ADMIN — HYDROCORTISONE SODIUM SUCCINATE 100 MG: 100 INJECTION, POWDER, FOR SOLUTION INTRAMUSCULAR; INTRAVENOUS at 07:31

## 2024-03-04 RX ADMIN — VANCOMYCIN HYDROCHLORIDE 1500 MG: 10 INJECTION, POWDER, LYOPHILIZED, FOR SOLUTION INTRAVENOUS at 07:38

## 2024-03-04 RX ADMIN — FENTANYL CITRATE 25 MCG: 50 INJECTION, SOLUTION INTRAMUSCULAR; INTRAVENOUS at 09:47

## 2024-03-04 RX ADMIN — FENTANYL CITRATE 50 MCG: 50 INJECTION, SOLUTION INTRAMUSCULAR; INTRAVENOUS at 08:40

## 2024-03-04 RX ADMIN — MIDAZOLAM 0.5 MG: 1 INJECTION INTRAMUSCULAR; INTRAVENOUS at 08:48

## 2024-03-04 RX ADMIN — FENTANYL CITRATE 25 MCG: 50 INJECTION, SOLUTION INTRAMUSCULAR; INTRAVENOUS at 08:57

## 2024-03-04 ASSESSMENT — ACTIVITIES OF DAILY LIVING (ADL)
ADLS_ACUITY_SCORE: 35

## 2024-03-04 NOTE — PROCEDURES
[facility]    Procedure: IR Procedure Note    Date/Time: 3/4/2024 9:56 AM    Performed by: Cassy Ramírez DO  Authorized by: Crissy Sainz MD      UNIVERSAL PROTOCOL   Site Marked: NA  Prior Images Obtained and Reviewed:  Yes  Required items: Required blood products, implants, devices and special equipment available    Patient identity confirmed:  Verbally with patient, arm band, provided demographic data and hospital-assigned identification number  Patient was reevaluated immediately before administering moderate or deep sedation or anesthesia  Confirmation Checklist:  Patient's identity using two indicators, relevant allergies, procedure was appropriate and matched the consent or emergent situation and correct equipment/implants were available  Time out: Immediately prior to the procedure a time out was called    Universal Protocol: the Joint Commission Universal Protocol was followed    Preparation: Patient was prepped and draped in usual sterile fashion       ANESTHESIA    Anesthesia:  Local infiltration  Local Anesthetic:  Lidocaine 1% without epinephrine  Anesthetic Total (mL):  8      SEDATION  Patient Sedated: Yes    Sedation Type:  Moderate (conscious) sedation  Sedation:  Fentanyl and midazolam  Vital signs: Vital signs monitored during sedation    See dictated procedure note for full details.  Findings: Right groin access. Segment 4 Y90 delivery. Closed with angioseal. No immediate complications.    Specimens: none    Complications: None    Condition: Stable    Plan: -2 hour bedrest      PROCEDURE    Patient Tolerance:  Patient tolerated the procedure well with no immediate complications  Length of time physician/provider present for 1:1 monitoring during sedation: 60

## 2024-03-04 NOTE — PROGRESS NOTES
Discharge criteria met. Pt able to eat, ambulate and void without difficulty. Right groin site intact. Reviewed discharge instructions and medications. PIV removed. Pushed to front door in wheelchair.

## 2024-03-04 NOTE — IR NOTE
Patient Name: Stewart Cummings  Medical Record Number: [id number]  Today's Date: 3/4/2024    Procedure: Y90 delivery   Proceduralist: Dr. Desiree Penn   Pathology present: N/A    Procedure Start: 0850  Procedure end: 0949  Sedation medications administered: Midazolam 3 mg, Fentanyl 175 mcg, Benadryl 25 mg      Report given to: PAVEL Mota 2A  : N/A    Other Notes: Pt arrived to IR room 1 from . Consent reviewed. Pt denies any questions or concerns regarding procedure. Pt positioned supine and monitored per protocol. Right femoral artery access.    5 Fr arterial sheath removed at 0948   6 Fr AngioSeal closure device deployed at 0948   FLAT BEDREST FOR 2 HOURS ending at 1148 per Dr. Ramírez      Pt tolerated procedure without any noted complications. Pt transferred back to .

## 2024-03-04 NOTE — DISCHARGE INSTRUCTIONS
[facility]   Interventional Radiology  Discharge Instructions Post Angiography for Insertion of   Radioactive Microspheres to the Liver    AFTER YOU GO HOME          Relax and take it easy for 24 hours.       Drink plenty of fluids.       Resume your regular diet, unless otherwise instructed by your Primary Physician.       DO NOT smoke for at least 24 hours, if you were given any sedation.       DO NOT drink alcoholic beverages the day of your procedure.       DO NOT drive or operate machinery at home or at work for 24 hours.          DO NOT make any important or legal decisions for 24 hours following your procedure.       DO NOT take a shower for at least 12 hours following your procedure. No tub bath, hot tubs, or swimming for 5 days        Care of groin site  It is normal to have a small bruise or lump at the site.  For the first 2 days, when you cough, sneeze or move your bowels, hold your hand over the puncture site and press gently.  Do NOT lift more than 10 pounds or do any strenuous exercise for at least 3 days.  Do not use lotion or powder near the puncture site for 3 days.  If you start bleeding from the site in your groin: lie down flat and press firmly on the site. Call your doctor as soon as you can.   Call 911 right away if you have: Heavy bleeding, bleeding that does not stop.      CALL THE PHYSICIAN IF:       - You start bleeding from the procedure site. A small lump or bruise is common at the puncture site. Your physician will tell you if you need to return to the hospital.      - You develop numbness, coolness or a change in color of the leg that was punctured.      - You experience increased pain or redness at the puncture site.      - You develop hives or a rash or unexplained itching.      - You develop a temperature of 101 degrees F or greater.    Additional Information:         Follow the Discharge Instructions for the Liver Brachytherapy; Contrast Instructions and  Instructions for the [employer].     Closure Device: AngioSeal            [facility] INTERVENTIONAL RADIOLOGY DEPARTMENT         Procedure Physician:  Dr. Krause and Dr. Ramírez  Date of Procedure:March 4, 2024       Telephone Numbers:   [phone]      Monday-Friday 7:30 am to 4:00 pm                                        [phone]     After 4:00 pm Monday-Friday, Weekend and Holidays. Ask for the Interventional Radiologist on call. Someone is on call 24 hrs/day.

## 2024-03-04 NOTE — PROGRESS NOTES
2A prep to Y90 Delivery. Pt alert and oriented. Approprietly NPO. Groin clipped. Pedal pulses with doppler. Left PIV infusing. Labs processing. Wife and daughter at bedside.

## 2024-03-04 NOTE — PRE-PROCEDURE
GENERAL PRE-PROCEDURE:   Procedure:  Visceral angiogram for radioembolization with y90    Written consent obtained?: Yes    Risks and benefits: Risks, benefits and alternatives were discussed    Consent given by:  Patient  Patient states understanding of procedure being performed: Yes    Patient's understanding of procedure matches consent: Yes    Procedure consent matches procedure scheduled: Yes    Expected level of sedation:  Moderate  Appropriately NPO:  Yes  ASA Class:  1  Mallampati  :  Grade 1- soft palate, uvula, tonsillar pillars, and posterior pharyngeal wall visible  Lungs:  Lungs clear with good breath sounds bilaterally  Heart:  Normal heart sounds and rate  History & Physical reviewed:  History and physical reviewed and no updates needed  Statement of review:  I have reviewed the lab findings, diagnostic data, medications, and the plan for sedation

## 2024-03-04 NOTE — PROGRESS NOTES
S/p Y 90 delivery. Pt alert and oriented. VSS. Right groin site intact. Denies pain/nausea. Bedrest x  2hrs.

## 2024-03-05 LAB
ATRIAL RATE - MUSE: 61 BPM
DIASTOLIC BLOOD PRESSURE - MUSE: NORMAL MMHG
INTERPRETATION ECG - MUSE: NORMAL
P AXIS - MUSE: -9 DEGREES
PR INTERVAL - MUSE: 192 MS
QRS DURATION - MUSE: 88 MS
QT - MUSE: 444 MS
QTC - MUSE: 446 MS
R AXIS - MUSE: -7 DEGREES
SYSTOLIC BLOOD PRESSURE - MUSE: NORMAL MMHG
T AXIS - MUSE: 21 DEGREES
VENTRICULAR RATE- MUSE: 61 BPM

## 2024-03-06 ENCOUNTER — TELEPHONE (OUTPATIENT)
Dept: RADIOLOGY | Facility: CLINIC | Age: 74
End: 2024-03-06
Payer: MEDICARE

## 2024-03-06 DIAGNOSIS — R16.0 LIVER MASS: Primary | ICD-10-CM

## 2024-03-06 NOTE — TELEPHONE ENCOUNTER
I called and spoke with Stewart. He is feeling well post Y90 delivery. Access site clean, dry and intact. He denies any pain or nausea. He does state that he is feeling more tired than usual, but able to get up and move around. I will contact pt with follow up appts with Dr. Hanson. He has my contact info if needed.     Delfina Lai RN  Nurse Care Coordinator-Interventional Radiology  [phone]

## 2024-03-08 NOTE — TELEPHONE ENCOUNTER
3/8/2024  I called and spoke with Stewart. He continues to do well post Y90 delivery. He is having some mild abdominal pain, but very manageable. He will have imaging/labs and a clinic visit with Dr Mata in ~1 month. Pt agreed with POC.   Thanks!    Delfina Lai RN  Nurse Care Coordinator-Interventional Radiology  [phone]

## 2024-03-11 DIAGNOSIS — C22.0 HCC (HEPATOCELLULAR CARCINOMA) (H): Primary | ICD-10-CM

## 2024-04-02 NOTE — PROGRESS NOTES
Interventional Radiology Clinic Visit    Date of this visit: 4/5/2024    Stewart Cummings returns for follow up post radioembolization.    Primary Physician: Eliazar Seaman        History Of Present Illness:    Stewart Cummings is a 72 yo male with past medical history of compensated MASLD cirrhosis, T2DM, and obesity, who underwent selective radioembolization of a centrally located small segment 4A hepatocellular carcinoma lesion on 2/20/2024 that was in an unfavorable location for ablation. He presents for follow up today.    Today Stewart says he is doing well. He had mild RUQ and shoulder pain that resolved, did not need oxycodone. Has some fatigue still, slowly improving. Had 2 days in a row of feeling hot in his head like a fever, no flushing, no fever on home thermometer. Groin healed ok.      Review of Systems:    As above    Past Medical/Surgical History:    Past Medical History:   Diagnosis Date    HTN (hypertension)     MASLD Cirrhosis     Obesity     Type 2 diabetes mellitus (H)      Past Surgical History:   Procedure Laterality Date    IR SIRT (SELECTIVE INTERNAL RADIO THERAPY)  2/20/2024    IR VISCERAL ANGIOGRAM  2/20/2024    IR VISCERAL EMBOLIZATION  3/4/2024    Robotic assisted laparoscopic bilateral inguinal hernia repair with mesh      2/2023    TONSILLECTOMY         Current Medications:    Current Outpatient Medications   Medication Sig Dispense Refill    albuterol (PROAIR HFA/PROVENTIL HFA/VENTOLIN HFA) 108 (90 Base) MCG/ACT inhaler Inhale 1-2 puffs into the lungs every 4 hours as needed      Ascorbic Acid (VITAMIN C PO) Take 1 tablet by mouth daily      EPINEPHrine (ANY BX GENERIC EQUIV) 0.3 MG/0.3ML injection 2-pack Inject 0.3 mg into the muscle as needed      finasteride (PROSCAR) 5 MG tablet Take 5 mg by mouth daily      fluticasone (FLONASE) 50 MCG/ACT nasal spray Spray 2 sprays into both nostrils daily as needed      fluticasone (FLOVENT HFA) 110 MCG/ACT inhaler Inhale 2 puffs into the  lungs 2 times daily as needed      hydroxychloroquine (PLAQUENIL) 200 MG tablet Take 400 mg by mouth daily      lisinopril (ZESTRIL) 20 MG tablet Take 1 tablet by mouth daily      metFORMIN (GLUCOPHAGE) 500 MG tablet Take 500 mg by mouth daily (with breakfast)      methylPREDNISolone (MEDROL DOSEPAK) 4 MG tablet therapy pack Take as directed on package. 21 tablet 0    Multiple Vitamin (MULTIVITAMIN PO) Take 1 tablet by mouth daily      ondansetron (ZOFRAN) 4 MG tablet Take 1-2 tablets (4-8 mg) by mouth every 6 hours as needed for nausea (vomiting) 40 tablet 0    oxyCODONE (ROXICODONE) 5 MG tablet Take 1-2 tablets (5-10 mg) by mouth every 6 hours as needed for moderate to severe pain 10 tablet 0    pantoprazole (PROTONIX) 40 MG EC tablet Take 1 tablet (40 mg) by mouth daily 30 tablet 0    tamsulosin (FLOMAX) 0.4 MG capsule Take 0.4 mg by mouth daily         Allergies:    Tomato, Cephalexin, Tetanus toxoids, Acyclovir, and Ciprofloxacin    Family History:    No family history on file.    Social History:      Social History     Socioeconomic History    Marital status:    Tobacco Use    Smoking status: Former     Types: Cigarettes    Smokeless tobacco: Never    Tobacco comments:     Smoked in high school. Has not smoked in 50+ years   Substance and Sexual Activity    Alcohol use: Not Currently    Drug use: Not Currently       Physical Exam:    CONSTITUTIONAL: healthy, alert and no distress.  PSYCHIATRIC: mentation appears normal and affect normal.  NEURO: Normal movements and speech.  EYES: No jaundice or pallor.  SKIN: No jaundice.   RESP: No audible cough or wheeze.      Laboratory Studies:    Lab Results   Component Value Date    HGB 13.1 03/04/2024     Lab Results   Component Value Date     03/04/2024     Lab Results   Component Value Date    WBC 3.9 03/04/2024       Lab Results   Component Value Date    INR 1.13 03/04/2024       Lab Results   Component Value Date    PROTTOTAL 6.6 03/04/2024      Lab  Results   Component Value Date    ALBUMIN 3.7 03/04/2024     Lab Results   Component Value Date    BILITOTAL 1.6 03/04/2024     Lab Results   Component Value Date    ALKPHOS 99 03/04/2024     Lab Results   Component Value Date    AST 37 03/04/2024     Lab Results   Component Value Date    ALT 11 03/04/2024       Lab Results   Component Value Date    CR 0.84 03/04/2024     Lab Results   Component Value Date    BUN 12.1 03/04/2024       AFP 12.6 4/3/24 (previously 4.1 on 1/16/24).    Imaging:     I personally reviewed and interpreted the MRI from 4/3/24: The segment 4 lesion no longer demonstrates enhancement, compatible with successful treatment.  Scattered LIRADS 3 lesions are seen.  No new lesion of concern.      ASSESSMENT/PLAN:      Stewart Cummings is a 73 year old male with cirrhosis and a solitary central segment 4A lesion and a status post selective radioembolization.  He has made a good recovery.  We discussed his imaging and lab findings.  Follow-up imaging shows successful treatment with no evidence of residual tumor.  There are a few LI-RADS 3 lesions that we will follow for now.  His AFP is newly minimally elevated that may be secondary to the hepatic inflammation at the treatment site, and we will observe for now.  Will see him again with another MRI in 2 months. He expressed understanding and agreed with this plan.          It was a pleasure seeing the patient.     Crissy Castillo M.D.    Interventional Radiology  Department of Radiology  [facility]      CC  Patient Care Team:  Eliazar Seaman MD as PCP - General (Pediatrics)  Jess Lai RN as Specialty Care Coordinator (Vascular and Interventional Radiology)  CRISSY RUEDA           30 minutes spent by me on the date of the encounter doing chart review, history and exam, imaging review, documentation and further activities per the note.

## 2024-04-03 ENCOUNTER — LAB (OUTPATIENT)
Dept: LAB | Facility: CLINIC | Age: 74
End: 2024-04-03
Attending: RADIOLOGY
Payer: MEDICARE

## 2024-04-03 ENCOUNTER — ANCILLARY PROCEDURE (OUTPATIENT)
Dept: MRI IMAGING | Facility: CLINIC | Age: 74
End: 2024-04-03
Attending: STUDENT IN AN ORGANIZED HEALTH CARE EDUCATION/TRAINING PROGRAM
Payer: MEDICARE

## 2024-04-03 DIAGNOSIS — R16.0 LIVER MASS: ICD-10-CM

## 2024-04-03 LAB
AFP SERPL-MCNC: 12.6 NG/ML
ALBUMIN SERPL BCG-MCNC: 3.4 G/DL (ref 3.5–5.2)
ALP SERPL-CCNC: 133 U/L (ref 40–150)
ALT SERPL W P-5'-P-CCNC: 16 U/L (ref 0–70)
ANION GAP SERPL CALCULATED.3IONS-SCNC: 9 MMOL/L (ref 7–15)
AST SERPL W P-5'-P-CCNC: 46 U/L (ref 0–45)
BILIRUB DIRECT SERPL-MCNC: 0.7 MG/DL (ref 0–0.3)
BILIRUB SERPL-MCNC: 1.5 MG/DL
BUN SERPL-MCNC: 8.9 MG/DL (ref 8–23)
CALCIUM SERPL-MCNC: 8.3 MG/DL (ref 8.8–10.2)
CHLORIDE SERPL-SCNC: 104 MMOL/L (ref 98–107)
CREAT SERPL-MCNC: 0.75 MG/DL (ref 0.67–1.17)
DEPRECATED HCO3 PLAS-SCNC: 26 MMOL/L (ref 22–29)
EGFRCR SERPLBLD CKD-EPI 2021: >90 ML/MIN/1.73M2
ERYTHROCYTE [DISTWIDTH] IN BLOOD BY AUTOMATED COUNT: 13 % (ref 10–15)
GLUCOSE SERPL-MCNC: 132 MG/DL (ref 70–99)
HCT VFR BLD AUTO: 37 % (ref 40–53)
HGB BLD-MCNC: 12.9 G/DL (ref 13.3–17.7)
INR PPP: 1.11 (ref 0.85–1.15)
MCH RBC QN AUTO: 33.4 PG (ref 26.5–33)
MCHC RBC AUTO-ENTMCNC: 34.9 G/DL (ref 31.5–36.5)
MCV RBC AUTO: 96 FL (ref 78–100)
PLATELET # BLD AUTO: 82 10E3/UL (ref 150–450)
POTASSIUM SERPL-SCNC: 4.2 MMOL/L (ref 3.4–5.3)
PROT SERPL-MCNC: 6.5 G/DL (ref 6.4–8.3)
RBC # BLD AUTO: 3.86 10E6/UL (ref 4.4–5.9)
SODIUM SERPL-SCNC: 139 MMOL/L (ref 135–145)
WBC # BLD AUTO: 2.8 10E3/UL (ref 4–11)

## 2024-04-03 PROCEDURE — A9585 GADOBUTROL INJECTION: HCPCS | Performed by: RADIOLOGY

## 2024-04-03 PROCEDURE — 85027 COMPLETE CBC AUTOMATED: CPT | Performed by: PATHOLOGY

## 2024-04-03 PROCEDURE — 99000 SPECIMEN HANDLING OFFICE-LAB: CPT | Mod: GA | Performed by: PATHOLOGY

## 2024-04-03 PROCEDURE — G1010 CDSM STANSON: HCPCS | Performed by: RADIOLOGY

## 2024-04-03 PROCEDURE — 85610 PROTHROMBIN TIME: CPT | Performed by: PATHOLOGY

## 2024-04-03 PROCEDURE — 74183 MRI ABD W/O CNTR FLWD CNTR: CPT | Mod: MG | Performed by: RADIOLOGY

## 2024-04-03 PROCEDURE — 36415 COLL VENOUS BLD VENIPUNCTURE: CPT | Performed by: PATHOLOGY

## 2024-04-03 PROCEDURE — 82248 BILIRUBIN DIRECT: CPT | Performed by: PATHOLOGY

## 2024-04-03 PROCEDURE — 82105 ALPHA-FETOPROTEIN SERUM: CPT | Mod: GA | Performed by: RADIOLOGY

## 2024-04-03 PROCEDURE — 80053 COMPREHEN METABOLIC PANEL: CPT | Performed by: PATHOLOGY

## 2024-04-03 RX ORDER — GADOBUTROL 604.72 MG/ML
15 INJECTION INTRAVENOUS ONCE
Status: COMPLETED | OUTPATIENT
Start: 2024-04-03 | End: 2024-04-03

## 2024-04-03 RX ADMIN — GADOBUTROL 11 ML: 604.72 INJECTION INTRAVENOUS at 08:15

## 2024-04-03 NOTE — DISCHARGE INSTRUCTIONS
MRI Contrast Discharge Instructions    The IV contrast you received today will pass out of your body in your  urine. This will happen in the next 24 hours. You will not feel this process.  Your urine will not change color.    Drink at least 4 extra glasses of water or juice today (unless your doctor  has restricted your fluids). This reduces the stress on your kidneys.  You may take your regular medicines.    If you are on dialysis: It is best to have dialysis today.    If you have a reaction: Most reactions happen right away. If you have  any new symptoms after leaving the hospital (such as hives or swelling),  call your hospital at the correct number below. Or call your family doctor.  If you have breathing distress or wheezing, call 911.    Special instructions: ***    I have read and understand the above information.    Signature:______________________________________ Date:___________    Staff:__________________________________________ Date:___________     Time:__________    [place] [facility] Departments:    ___[place]: [phone]  ___[facility]: [phone]  ___[place]: [phone] ___[facility]: [phone]  ___[facility]: [phone]  ___[facility]: [phone]  ___[phone]  ___[facility]: [phone]  ___[phone]

## 2024-04-05 ENCOUNTER — ONCOLOGY VISIT (OUTPATIENT)
Dept: RADIOLOGY | Facility: CLINIC | Age: 74
End: 2024-04-05
Attending: RADIOLOGY
Payer: MEDICARE

## 2024-04-05 VITALS
RESPIRATION RATE: 18 BRPM | SYSTOLIC BLOOD PRESSURE: 116 MMHG | HEART RATE: 83 BPM | DIASTOLIC BLOOD PRESSURE: 79 MMHG | WEIGHT: 265.2 LBS | OXYGEN SATURATION: 96 % | BODY MASS INDEX: 39.74 KG/M2 | TEMPERATURE: 98.1 F

## 2024-04-05 DIAGNOSIS — R16.0 LIVER MASS: Primary | ICD-10-CM

## 2024-04-05 PROCEDURE — 99214 OFFICE O/P EST MOD 30 MIN: CPT | Performed by: RADIOLOGY

## 2024-04-05 PROCEDURE — G0463 HOSPITAL OUTPT CLINIC VISIT: HCPCS | Performed by: RADIOLOGY

## 2024-04-05 ASSESSMENT — PAIN SCALES - GENERAL: PAINLEVEL: NO PAIN (0)

## 2024-04-05 NOTE — PATIENT INSTRUCTIONS
Lazaro William,   You had your clinic visit with Dr. Hanson today. She would like a repeat MRI, labs and a clinic visit in 2 months. I have ordered these and our scheduling team will contact you to set this up. Let me know if you have any questions or concerns in the meantime.   Take care!  Delfina Lai RN  Nurse Care Coordinator-Interventional Radiology  [phone]

## 2024-04-05 NOTE — NURSING NOTE
"Oncology Rooming Note    April 5, 2024 8:24 AM   Stewart Cummings is a 73 year old male who presents for:    Chief Complaint   Patient presents with    Oncology Clinic Visit     RTN for Liver Mass      Initial Vitals: /79   Pulse 83   Temp 98.1  F (36.7  C) (Oral)   Resp 18   Wt 120.3 kg (265 lb 3.2 oz)   SpO2 96%   BMI 39.74 kg/m   Estimated body mass index is 39.74 kg/m  as calculated from the following:    Height as of 1/16/24: 1.74 m (5' 8.5\").    Weight as of this encounter: 120.3 kg (265 lb 3.2 oz). Body surface area is 2.41 meters squared.  No Pain (0) Comment: Data Unavailable   No LMP for male patient.  Allergies reviewed: Yes  Medications reviewed: Yes    Medications: Medication refills not needed today.  Pharmacy name entered into [employer]: [id number] - [place], [place] - [address] [facility]    Frailty Screening:   Is the patient here for a new oncology consult visit in cancer care? 2. No      Clinical concerns: none       Jeannie Rosales MA             "

## 2024-04-05 NOTE — LETTER
4/5/2024         RE: Stewart Cummings  [id number] [facility] [address]        Dear Colleague,    Thank you for referring your patient, Stewart Cummings, to the [facility]. Please see a copy of my visit note below.        Interventional Radiology Clinic Visit    Date of this visit: 4/5/2024    Stewart Cummings returns for follow up post radioembolization.    Primary Physician: Eliazar Seaman        History Of Present Illness:    Stewart Cummings is a 74 yo male with past medical history of compensated MASLD cirrhosis, T2DM, and obesity, who underwent selective radioembolization of a centrally located small segment 4A hepatocellular carcinoma lesion on 2/20/2024 that was in an unfavorable location for ablation. He presents for follow up today.    Today Stewart says he is doing well. He had mild RUQ and shoulder pain that resolved, did not need oxycodone. Has some fatigue still, slowly improving. Had 2 days in a row of feeling hot in his head like a fever, no flushing, no fever on home thermometer. Groin healed ok.      Review of Systems:    As above    Past Medical/Surgical History:    Past Medical History:   Diagnosis Date    HTN (hypertension)     MASLD Cirrhosis     Obesity     Type 2 diabetes mellitus (H)      Past Surgical History:   Procedure Laterality Date    IR SIRT (SELECTIVE INTERNAL RADIO THERAPY)  2/20/2024    IR VISCERAL ANGIOGRAM  2/20/2024    IR VISCERAL EMBOLIZATION  3/4/2024    Robotic assisted laparoscopic bilateral inguinal hernia repair with mesh      2/2023    TONSILLECTOMY         Current Medications:    Current Outpatient Medications   Medication Sig Dispense Refill    albuterol (PROAIR HFA/PROVENTIL HFA/VENTOLIN HFA) 108 (90 Base) MCG/ACT inhaler Inhale 1-2 puffs into the lungs every 4 hours as needed      Ascorbic Acid (VITAMIN C PO) Take 1 tablet by mouth daily      EPINEPHrine (ANY BX GENERIC EQUIV) 0.3 MG/0.3ML injection 2-pack Inject 0.3 mg  into the muscle as needed      finasteride (PROSCAR) 5 MG tablet Take 5 mg by mouth daily      fluticasone (FLONASE) 50 MCG/ACT nasal spray Spray 2 sprays into both nostrils daily as needed      fluticasone (FLOVENT HFA) 110 MCG/ACT inhaler Inhale 2 puffs into the lungs 2 times daily as needed      hydroxychloroquine (PLAQUENIL) 200 MG tablet Take 400 mg by mouth daily      lisinopril (ZESTRIL) 20 MG tablet Take 1 tablet by mouth daily      metFORMIN (GLUCOPHAGE) 500 MG tablet Take 500 mg by mouth daily (with breakfast)      methylPREDNISolone (MEDROL DOSEPAK) 4 MG tablet therapy pack Take as directed on package. 21 tablet 0    Multiple Vitamin (MULTIVITAMIN PO) Take 1 tablet by mouth daily      ondansetron (ZOFRAN) 4 MG tablet Take 1-2 tablets (4-8 mg) by mouth every 6 hours as needed for nausea (vomiting) 40 tablet 0    oxyCODONE (ROXICODONE) 5 MG tablet Take 1-2 tablets (5-10 mg) by mouth every 6 hours as needed for moderate to severe pain 10 tablet 0    pantoprazole (PROTONIX) 40 MG EC tablet Take 1 tablet (40 mg) by mouth daily 30 tablet 0    tamsulosin (FLOMAX) 0.4 MG capsule Take 0.4 mg by mouth daily         Allergies:    Tomato, Cephalexin, Tetanus toxoids, Acyclovir, and Ciprofloxacin    Family History:    No family history on file.    Social History:      Social History     Socioeconomic History    Marital status:    Tobacco Use    Smoking status: Former     Types: Cigarettes    Smokeless tobacco: Never    Tobacco comments:     Smoked in high school. Has not smoked in 50+ years   Substance and Sexual Activity    Alcohol use: Not Currently    Drug use: Not Currently       Physical Exam:    CONSTITUTIONAL: healthy, alert and no distress.  PSYCHIATRIC: mentation appears normal and affect normal.  NEURO: Normal movements and speech.  EYES: No jaundice or pallor.  SKIN: No jaundice.   RESP: No audible cough or wheeze.      Laboratory Studies:    Lab Results   Component Value Date    HGB 13.1 03/04/2024      Lab Results   Component Value Date     03/04/2024     Lab Results   Component Value Date    WBC 3.9 03/04/2024       Lab Results   Component Value Date    INR 1.13 03/04/2024       Lab Results   Component Value Date    PROTTOTAL 6.6 03/04/2024      Lab Results   Component Value Date    ALBUMIN 3.7 03/04/2024     Lab Results   Component Value Date    BILITOTAL 1.6 03/04/2024     Lab Results   Component Value Date    ALKPHOS 99 03/04/2024     Lab Results   Component Value Date    AST 37 03/04/2024     Lab Results   Component Value Date    ALT 11 03/04/2024       Lab Results   Component Value Date    CR 0.84 03/04/2024     Lab Results   Component Value Date    BUN 12.1 03/04/2024       AFP 12.6 4/3/24 (previously 4.1 on 1/16/24).    Imaging:     I personally reviewed and interpreted the MRI from 4/3/24: The segment 4 lesion no longer demonstrates enhancement, compatible with successful treatment.  Scattered LIRADS 3 lesions are seen.  No new lesion of concern.      ASSESSMENT/PLAN:      Stewart Cummings is a 73 year old male with cirrhosis and a solitary central segment 4A lesion and a status post selective radioembolization.  He has made a good recovery.  We discussed his imaging and lab findings.  Follow-up imaging shows successful treatment with no evidence of residual tumor.  There are a few LI-RADS 3 lesions that we will follow for now.  His AFP is newly minimally elevated that may be secondary to the hepatic inflammation at the treatment site, and we will observe for now.  Will see him again with another MRI in 2 months. He expressed understanding and agreed with this plan.          It was a pleasure seeing the patient.     Signed,    Crissy Krause M.D.    Interventional Radiology  Department of Radiology  [facility]      CC  Patient Care Team:  Eliazar Seaman MD as PCP - General (Pediatrics)  Jess Lai RN as Specialty Care Coordinator (Vascular and  Interventional Radiology)  SHARON RUEDA           30 minutes spent by me on the date of the encounter doing chart review, history and exam, imaging review, documentation and further activities per the note.

## 2024-06-09 ENCOUNTER — HEALTH MAINTENANCE LETTER (OUTPATIENT)
Age: 74
End: 2024-06-09

## 2024-07-01 ENCOUNTER — ANCILLARY PROCEDURE (OUTPATIENT)
Dept: MRI IMAGING | Facility: CLINIC | Age: 74
End: 2024-07-01
Attending: INTERNAL MEDICINE
Payer: MEDICARE

## 2024-07-01 ENCOUNTER — LAB (OUTPATIENT)
Dept: LAB | Facility: CLINIC | Age: 74
End: 2024-07-01
Attending: RADIOLOGY
Payer: MEDICARE

## 2024-07-01 DIAGNOSIS — K76.0 NAFLD (NONALCOHOLIC FATTY LIVER DISEASE): Primary | ICD-10-CM

## 2024-07-01 DIAGNOSIS — K75.81 LIVER CIRRHOSIS SECONDARY TO NASH (H): ICD-10-CM

## 2024-07-01 DIAGNOSIS — R93.2 ABNORMAL FINDINGS ON DIAGNOSTIC IMAGING OF LIVER AND BILIARY TRACT: ICD-10-CM

## 2024-07-01 DIAGNOSIS — R16.0 LIVER MASS: ICD-10-CM

## 2024-07-01 DIAGNOSIS — K74.60 LIVER CIRRHOSIS SECONDARY TO NASH (H): ICD-10-CM

## 2024-07-01 LAB
AFP SERPL-MCNC: 7.6 NG/ML
ALBUMIN SERPL BCG-MCNC: 3.4 G/DL (ref 3.5–5.2)
ALP SERPL-CCNC: 144 U/L (ref 40–150)
ALT SERPL W P-5'-P-CCNC: 19 U/L (ref 0–70)
ANION GAP SERPL CALCULATED.3IONS-SCNC: 7 MMOL/L (ref 7–15)
AST SERPL W P-5'-P-CCNC: 47 U/L (ref 0–45)
BILIRUB DIRECT SERPL-MCNC: 0.56 MG/DL (ref 0–0.3)
BILIRUB SERPL-MCNC: 1.3 MG/DL
BUN SERPL-MCNC: 12.9 MG/DL (ref 8–23)
CALCIUM SERPL-MCNC: 8.8 MG/DL (ref 8.8–10.2)
CHLORIDE SERPL-SCNC: 105 MMOL/L (ref 98–107)
CREAT SERPL-MCNC: 0.74 MG/DL (ref 0.67–1.17)
DEPRECATED HCO3 PLAS-SCNC: 26 MMOL/L (ref 22–29)
EGFRCR SERPLBLD CKD-EPI 2021: >90 ML/MIN/1.73M2
ERYTHROCYTE [DISTWIDTH] IN BLOOD BY AUTOMATED COUNT: 13.1 % (ref 10–15)
GLUCOSE SERPL-MCNC: 105 MG/DL (ref 70–99)
HCT VFR BLD AUTO: 37.3 % (ref 40–53)
HGB BLD-MCNC: 13.1 G/DL (ref 13.3–17.7)
INR PPP: 1.25 (ref 0.85–1.15)
MCH RBC QN AUTO: 34.6 PG (ref 26.5–33)
MCHC RBC AUTO-ENTMCNC: 35.1 G/DL (ref 31.5–36.5)
MCV RBC AUTO: 98 FL (ref 78–100)
PLATELET # BLD AUTO: 96 10E3/UL (ref 150–450)
POTASSIUM SERPL-SCNC: 4.4 MMOL/L (ref 3.4–5.3)
PROT SERPL-MCNC: 6.3 G/DL (ref 6.4–8.3)
RBC # BLD AUTO: 3.79 10E6/UL (ref 4.4–5.9)
SODIUM SERPL-SCNC: 138 MMOL/L (ref 135–145)
WBC # BLD AUTO: 3.7 10E3/UL (ref 4–11)

## 2024-07-01 PROCEDURE — 85027 COMPLETE CBC AUTOMATED: CPT | Performed by: PATHOLOGY

## 2024-07-01 PROCEDURE — 85610 PROTHROMBIN TIME: CPT | Performed by: PATHOLOGY

## 2024-07-01 PROCEDURE — A9585 GADOBUTROL INJECTION: HCPCS | Mod: JW | Performed by: RADIOLOGY

## 2024-07-01 PROCEDURE — 36415 COLL VENOUS BLD VENIPUNCTURE: CPT | Performed by: PATHOLOGY

## 2024-07-01 PROCEDURE — G1010 CDSM STANSON: HCPCS | Performed by: RADIOLOGY

## 2024-07-01 PROCEDURE — 74183 MRI ABD W/O CNTR FLWD CNTR: CPT | Mod: MG | Performed by: RADIOLOGY

## 2024-07-01 PROCEDURE — 80053 COMPREHEN METABOLIC PANEL: CPT | Performed by: PATHOLOGY

## 2024-07-01 PROCEDURE — 82248 BILIRUBIN DIRECT: CPT | Performed by: PATHOLOGY

## 2024-07-01 PROCEDURE — 99000 SPECIMEN HANDLING OFFICE-LAB: CPT | Performed by: PATHOLOGY

## 2024-07-01 PROCEDURE — 82105 ALPHA-FETOPROTEIN SERUM: CPT | Performed by: INTERNAL MEDICINE

## 2024-07-01 RX ORDER — GADOBUTROL 604.72 MG/ML
15 INJECTION INTRAVENOUS ONCE
Status: COMPLETED | OUTPATIENT
Start: 2024-07-01 | End: 2024-07-01

## 2024-07-01 RX ADMIN — GADOBUTROL 12 ML: 604.72 INJECTION INTRAVENOUS at 07:16

## 2024-07-15 NOTE — PROGRESS NOTES
Interventional Radiology Clinic Visit    Date of this visit: 7/19/2024    Stewart Cummings returns for follow up post radioembolization.     Primary Physician: Eliazar Seaman        History Of Present Illness:    Stewart Cummings is a 74 yo male with past medical history of compensated MASLD cirrhosis, T2DM, and obesity, who underwent selective radioembolization of a centrally located small segment 4A hepatocellular carcinoma lesion on 2/20/2024 that was in an unfavorable location for ablation.  When I saw him for follow-up on 4/5/2024 he had made a good recovery and imaging showed the tumor had been well treated with no evidence of enhancement.  Scattered LIRADS 3 lesions were seen.  His AFP increased minimally which we had attributed to hepatic inflammation at the treatment site.  He presents for further follow-up today.    He has been doing well since the procedure and since his last follow up visit. He denies any pain, jaundice, or pruritus. No leg swelling. He has started to travel again, and just got back from [place] yesterday. He did have some questions regarding lab values which we discussed.    Review of Systems:    As above    Past Medical/Surgical History:    Past Medical History:   Diagnosis Date    HTN (hypertension)     MASLD Cirrhosis     Obesity     Type 2 diabetes mellitus (H)      Past Surgical History:   Procedure Laterality Date    IR SIRT (SELECTIVE INTERNAL RADIO THERAPY)  2/20/2024    IR VISCERAL ANGIOGRAM  2/20/2024    IR VISCERAL EMBOLIZATION  3/4/2024    Robotic assisted laparoscopic bilateral inguinal hernia repair with mesh      2/2023    TONSILLECTOMY         Current Medications:    Current Outpatient Medications   Medication Sig Dispense Refill    albuterol (PROAIR HFA/PROVENTIL HFA/VENTOLIN HFA) 108 (90 Base) MCG/ACT inhaler Inhale 1-2 puffs into the lungs every 4 hours as needed      Ascorbic Acid (VITAMIN C PO) Take 1 tablet by mouth daily      EPINEPHrine (ANY BX GENERIC  EQUIV) 0.3 MG/0.3ML injection 2-pack Inject 0.3 mg into the muscle as needed      fluticasone (FLONASE) 50 MCG/ACT nasal spray Spray 2 sprays into both nostrils daily as needed      fluticasone (FLOVENT HFA) 110 MCG/ACT inhaler Inhale 2 puffs into the lungs 2 times daily as needed      hydroxychloroquine (PLAQUENIL) 200 MG tablet Take 400 mg by mouth daily      lisinopril (ZESTRIL) 20 MG tablet Take 1 tablet by mouth daily      metFORMIN (GLUCOPHAGE) 500 MG tablet Take 500 mg by mouth daily (with breakfast)      Multiple Vitamin (MULTIVITAMIN PO) Take 1 tablet by mouth daily      ondansetron (ZOFRAN) 4 MG tablet Take 1-2 tablets (4-8 mg) by mouth every 6 hours as needed for nausea (vomiting) 40 tablet 0    pantoprazole (PROTONIX) 40 MG EC tablet Take 1 tablet (40 mg) by mouth daily (Patient not taking: Reported on 4/5/2024) 30 tablet 0       Allergies:    Tomato, Cephalexin, Tetanus toxoids, Acyclovir, and Ciprofloxacin    Family History:    No family history on file.      Social History:    Social History     Socioeconomic History    Marital status:    Tobacco Use    Smoking status: Former     Types: Cigarettes    Smokeless tobacco: Never    Tobacco comments:     Smoked in high school. Has not smoked in 50+ years   Substance and Sexual Activity    Alcohol use: Not Currently    Drug use: Not Currently       Physical Exam:    /71 (BP Location: Right arm, Patient Position: Sitting, Cuff Size: Adult Large)   Pulse 62   Temp 98  F (36.7  C) (Oral)   Resp 16   Wt 122 kg (268 lb 14.4 oz)   SpO2 97%   BMI 40.29 kg/m      CONSTITUTIONAL: healthy, alert and no distress.  PSYCHIATRIC: mentation appears normal and affect normal.  NEURO: Normal movements and speech.  EYES: No jaundice or pallor.  SKIN: No jaundice.   RESP: No audible cough or wheeze.      Laboratory Studies:    Lab Results   Component Value Date    HGB 13.1 07/01/2024     Lab Results   Component Value Date    PLT 96 07/01/2024     Lab Results    Component Value Date    WBC 3.7 07/01/2024       Lab Results   Component Value Date    INR 1.25 07/01/2024       Lab Results   Component Value Date    PROTTOTAL 6.3 07/01/2024      Lab Results   Component Value Date    ALBUMIN 3.4 07/01/2024     Lab Results   Component Value Date    BILITOTAL 1.3 07/01/2024     Lab Results   Component Value Date    ALKPHOS 144 07/01/2024     Lab Results   Component Value Date    AST 47 07/01/2024     Lab Results   Component Value Date    ALT 19 07/01/2024       Lab Results   Component Value Date    CR 0.74 07/01/2024     Lab Results   Component Value Date    BUN 12.9 07/01/2024       AFP:  7.6 on 7/1/2024  12.6 on 4/3/2024  4.1 on 1/16/2024    Imaging:     I personally reviewed and interpreted the MRI from 7/1/24.  It shows posttreatment changes in segment 4 with no evidence of tumor recurrence.  Stable LIRADS 3 lesions and no new tumor.    ASSESSMENT/PLAN:      Stewart Cummings is a 73 year old male with a solitary small central segment 4 HCC, who is now 5 months post radioembolization with no evidence of tumor recurrence.  His AFP has normalized. He has stable LR 3 lesions, which will be followed. We discussed his lab values, specifically WBC/RBC/PLT which are mildly decreased but overall stable compared to prior values. This is likely secondary to hypersplenism from splenomegaly related to liver disease.     At this time, it would be appropriate for him to keep following with his hepatologist, Dr. Borrego. We have reached out to her team about this. If any new lesions were to appear, we will see him in clinic for potential treatment options.  He agreed with this plan.      It was a pleasure seeing the patient.     Signed,  Cassy Ramírez PGY 6    Co-signed,  Crissy Krause M.D.    Interventional Radiology  Department of Radiology  [facility]      CC  Patient Care Team:  Eliazar Seaman MD as PCP - General (Pediatrics)  Jess Lai RN as  Specialty Care Coordinator (Vascular and Interventional Radiology)  SELF, REFERRED      I, Dr Crissy Krause, was present with the fellow during the entire clinic visit, including the history and exam. I discussed the case with the fellow and agree with the findings as documented in the assessment and plan.       25 minutes spent by me on the date of the encounter doing chart review, history and exam, imaging review, documentation and further activities per the note.

## 2024-07-19 ENCOUNTER — ONCOLOGY VISIT (OUTPATIENT)
Dept: RADIOLOGY | Facility: CLINIC | Age: 74
End: 2024-07-19
Attending: RADIOLOGY
Payer: MEDICARE

## 2024-07-19 VITALS
RESPIRATION RATE: 16 BRPM | OXYGEN SATURATION: 97 % | HEART RATE: 62 BPM | SYSTOLIC BLOOD PRESSURE: 116 MMHG | BODY MASS INDEX: 40.29 KG/M2 | DIASTOLIC BLOOD PRESSURE: 71 MMHG | WEIGHT: 268.9 LBS | TEMPERATURE: 98 F

## 2024-07-19 DIAGNOSIS — C22.0 HCC (HEPATOCELLULAR CARCINOMA) (H): Primary | ICD-10-CM

## 2024-07-19 PROCEDURE — G0463 HOSPITAL OUTPT CLINIC VISIT: HCPCS | Performed by: RADIOLOGY

## 2024-07-19 PROCEDURE — 99213 OFFICE O/P EST LOW 20 MIN: CPT | Mod: GC | Performed by: RADIOLOGY

## 2024-07-19 RX ORDER — MOMETASONE FUROATE AND FORMOTEROL FUMARATE DIHYDRATE 100; 5 UG/1; UG/1
AEROSOL RESPIRATORY (INHALATION)
COMMUNITY

## 2024-07-19 RX ORDER — IRBESARTAN 150 MG/1
150 TABLET ORAL DAILY
COMMUNITY

## 2024-07-19 RX ORDER — FINASTERIDE 5 MG/1
5 TABLET, FILM COATED ORAL DAILY
COMMUNITY
Start: 2024-05-22 | End: 2025-05-22

## 2024-07-19 ASSESSMENT — PAIN SCALES - GENERAL: PAINLEVEL: MILD PAIN (3)

## 2024-07-19 NOTE — PATIENT INSTRUCTIONS
It was a pleasure to meet you and your wife today.     I will send a message to the Hepatology team to help make the follow up appt and Imaging/labs as noted during clinic.     They will take over care and will notify us should there be any further questions.     Sincerely ,    Radha NASCIMENTO RN, BSN  Interventional Radiology/Vascular  Nurse Coordinator   Phone: [phone]

## 2024-07-19 NOTE — NURSING NOTE
"Oncology Rooming Note    July 19, 2024 7:50 AM   Stewart Cummings is a 73 year old male who presents for:    Chief Complaint   Patient presents with    Oncology Clinic Visit     NAFLD (nonalcoholic fatty liver disease)        Initial Vitals: /71 (BP Location: Right arm, Patient Position: Sitting, Cuff Size: Adult Large)   Pulse 62   Temp 98  F (36.7  C) (Oral)   Resp 16   Wt 122 kg (268 lb 14.4 oz)   SpO2 97%   BMI 40.29 kg/m   Estimated body mass index is 40.29 kg/m  as calculated from the following:    Height as of 1/16/24: 1.74 m (5' 8.5\").    Weight as of this encounter: 122 kg (268 lb 14.4 oz). Body surface area is 2.43 meters squared.  Mild Pain (3) Comment: Data Unavailable   No LMP for male patient.  Allergies reviewed: Yes  Medications reviewed: Yes    Medications: Medication refills not needed today.  Pharmacy name entered into [employer]: [id number] - [place], [place] - [address]    Frailty Screening:   Is the patient here for a new oncology consult visit in cancer care? 2. No      Clinical concerns:  Pt has MRI and lab results with them and would like to discuss further.      Nona Austin            "

## 2024-07-19 NOTE — LETTER
7/19/2024      Stewart Cummings  [address] [address] [facility] [address]      Dear Colleague,    Thank you for referring your patient, Stewart Cummings, to the [facility]. Please see a copy of my visit note below.        Interventional Radiology Clinic Visit    Date of this visit: 7/19/2024    Stewart Cummings returns for follow up post radioembolization.     Primary Physician: Eliazar Seaman        History Of Present Illness:    Stewart Cummings is a 72 yo male with past medical history of compensated MASLD cirrhosis, T2DM, and obesity, who underwent selective radioembolization of a centrally located small segment 4A hepatocellular carcinoma lesion on 2/20/2024 that was in an unfavorable location for ablation.  When I saw him for follow-up on 4/5/2024 he had made a good recovery and imaging showed the tumor had been well treated with no evidence of enhancement.  Scattered LIRADS 3 lesions were seen.  His AFP increased minimally which we had attributed to hepatic inflammation at the treatment site.  He presents for further follow-up today.    He has been doing well since the procedure and since his last follow up visit. He denies any pain, jaundice, or pruritus. No leg swelling. He has started to travel again, and just got back from [place] yesterday. He did have some questions regarding lab values which we discussed.    Review of Systems:    As above    Past Medical/Surgical History:    Past Medical History:   Diagnosis Date     HTN (hypertension)      MASLD Cirrhosis      Obesity      Type 2 diabetes mellitus (H)      Past Surgical History:   Procedure Laterality Date     IR SIRT (SELECTIVE INTERNAL RADIO THERAPY)  2/20/2024     IR VISCERAL ANGIOGRAM  2/20/2024     IR VISCERAL EMBOLIZATION  3/4/2024     Robotic assisted laparoscopic bilateral inguinal hernia repair with mesh      2/2023     TONSILLECTOMY         Current Medications:    Current Outpatient Medications    Medication Sig Dispense Refill     albuterol (PROAIR HFA/PROVENTIL HFA/VENTOLIN HFA) 108 (90 Base) MCG/ACT inhaler Inhale 1-2 puffs into the lungs every 4 hours as needed       Ascorbic Acid (VITAMIN C PO) Take 1 tablet by mouth daily       EPINEPHrine (ANY BX GENERIC EQUIV) 0.3 MG/0.3ML injection 2-pack Inject 0.3 mg into the muscle as needed       fluticasone (FLONASE) 50 MCG/ACT nasal spray Spray 2 sprays into both nostrils daily as needed       fluticasone (FLOVENT HFA) 110 MCG/ACT inhaler Inhale 2 puffs into the lungs 2 times daily as needed       hydroxychloroquine (PLAQUENIL) 200 MG tablet Take 400 mg by mouth daily       lisinopril (ZESTRIL) 20 MG tablet Take 1 tablet by mouth daily       metFORMIN (GLUCOPHAGE) 500 MG tablet Take 500 mg by mouth daily (with breakfast)       Multiple Vitamin (MULTIVITAMIN PO) Take 1 tablet by mouth daily       ondansetron (ZOFRAN) 4 MG tablet Take 1-2 tablets (4-8 mg) by mouth every 6 hours as needed for nausea (vomiting) 40 tablet 0     pantoprazole (PROTONIX) 40 MG EC tablet Take 1 tablet (40 mg) by mouth daily (Patient not taking: Reported on 4/5/2024) 30 tablet 0       Allergies:    Tomato, Cephalexin, Tetanus toxoids, Acyclovir, and Ciprofloxacin    Family History:    No family history on file.      Social History:    Social History     Socioeconomic History     Marital status:    Tobacco Use     Smoking status: Former     Types: Cigarettes     Smokeless tobacco: Never     Tobacco comments:     Smoked in high school. Has not smoked in 50+ years   Substance and Sexual Activity     Alcohol use: Not Currently     Drug use: Not Currently       Physical Exam:    /71 (BP Location: Right arm, Patient Position: Sitting, Cuff Size: Adult Large)   Pulse 62   Temp 98  F (36.7  C) (Oral)   Resp 16   Wt 122 kg (268 lb 14.4 oz)   SpO2 97%   BMI 40.29 kg/m      CONSTITUTIONAL: healthy, alert and no distress.  PSYCHIATRIC: mentation appears normal and affect  normal.  NEURO: Normal movements and speech.  EYES: No jaundice or pallor.  SKIN: No jaundice.   RESP: No audible cough or wheeze.      Laboratory Studies:    Lab Results   Component Value Date    HGB 13.1 07/01/2024     Lab Results   Component Value Date    PLT 96 07/01/2024     Lab Results   Component Value Date    WBC 3.7 07/01/2024       Lab Results   Component Value Date    INR 1.25 07/01/2024       Lab Results   Component Value Date    PROTTOTAL 6.3 07/01/2024      Lab Results   Component Value Date    ALBUMIN 3.4 07/01/2024     Lab Results   Component Value Date    BILITOTAL 1.3 07/01/2024     Lab Results   Component Value Date    ALKPHOS 144 07/01/2024     Lab Results   Component Value Date    AST 47 07/01/2024     Lab Results   Component Value Date    ALT 19 07/01/2024       Lab Results   Component Value Date    CR 0.74 07/01/2024     Lab Results   Component Value Date    BUN 12.9 07/01/2024       AFP:  7.6 on 7/1/2024  12.6 on 4/3/2024  4.1 on 1/16/2024    Imaging:     I personally reviewed and interpreted the MRI from 7/1/24.  It shows posttreatment changes in segment 4 with no evidence of tumor recurrence.  Stable LIRADS 3 lesions and no new tumor.    ASSESSMENT/PLAN:      Stewart Cummings is a 73 year old male with a solitary small central segment 4 HCC, who is now 5 months post radioembolization with no evidence of tumor recurrence.  His AFP has normalized. He has stable LR 3 lesions, which will be followed. We discussed his lab values, specifically WBC/RBC/PLT which are mildly decreased but overall stable compared to prior values. This is likely secondary to hypersplenism from splenomegaly related to liver disease.     At this time, it would be appropriate for him to keep following with his hepatologist, Dr. Borrego. We have reached out to her team about this. If any new lesions were to appear, we will see him in clinic for potential treatment options.  He agreed with this plan.      It was a pleasure  seeing the patient.     Signed,  Cassy Ramírez PGY 6    Co-signed,  Crissy Krause M.D.    Interventional Radiology  Department of Radiology  [facility]  Patient Care Team:  Elaizar Seaman MD as PCP - General (Pediatrics)  Jess Lai RN as Specialty Care Coordinator (Vascular and Interventional Radiology)  SELF, REFERRED      I, Dr Crissy Krause, was present with the fellow during the entire clinic visit, including the history and exam. I discussed the case with the fellow and agree with the findings as documented in the assessment and plan.       25 minutes spent by me on the date of the encounter doing chart review, history and exam, imaging review, documentation and further activities per the note.      Again, thank you for allowing me to participate in the care of your patient.        Sincerely,        Crissy Sainz MD

## 2024-07-25 ENCOUNTER — TELEPHONE (OUTPATIENT)
Dept: GASTROENTEROLOGY | Facility: CLINIC | Age: 74
End: 2024-07-25
Payer: MEDICARE

## 2024-11-05 ENCOUNTER — DOCUMENTATION ONLY (OUTPATIENT)
Dept: MULTI SPECIALTY CLINIC | Facility: CLINIC | Age: 74
End: 2024-11-05
Payer: MEDICARE

## 2024-11-05 DIAGNOSIS — K76.0 NAFLD (NONALCOHOLIC FATTY LIVER DISEASE): ICD-10-CM

## 2024-11-05 DIAGNOSIS — C22.0 HCC (HEPATOCELLULAR CARCINOMA) (H): Primary | ICD-10-CM

## 2024-11-05 NOTE — PROGRESS NOTES
No show. Called patient - he said he had other things going on in his life. Interested in following up.

## 2024-11-18 ENCOUNTER — TELEPHONE (OUTPATIENT)
Dept: GASTROENTEROLOGY | Facility: CLINIC | Age: 74
End: 2024-11-18
Payer: MEDICARE

## 2024-11-18 NOTE — TELEPHONE ENCOUNTER
Returned call to patient. Let him know ok to keep appt 1/30. But will add him on waitlist for a sooner appointment.    Miriam SAMS LPN  Hepatology Clinic     [facility]    Phone Message    May a detailed message be left on voicemail: yes     Reason for Call: Other: PT is scheduled 12/12 for MRI and labs - reportedly needed to reschedule F/U from November as he was in hospital. Writer scheduled 1/30 for PT - sending to verify that he doesn't need sooner appt to F/U. Thank you!      Action Taken: Message routed to:  [facility] Hep.     Travel Screening: Not Applicable     Date of Service:

## 2024-12-12 ENCOUNTER — ANCILLARY PROCEDURE (OUTPATIENT)
Dept: MRI IMAGING | Facility: CLINIC | Age: 74
End: 2024-12-12
Attending: INTERNAL MEDICINE
Payer: MEDICARE

## 2024-12-12 ENCOUNTER — LAB (OUTPATIENT)
Dept: LAB | Facility: CLINIC | Age: 74
End: 2024-12-12
Payer: MEDICARE

## 2024-12-12 DIAGNOSIS — K76.0 NAFLD (NONALCOHOLIC FATTY LIVER DISEASE): ICD-10-CM

## 2024-12-12 DIAGNOSIS — K75.81 LIVER CIRRHOSIS SECONDARY TO NASH (H): ICD-10-CM

## 2024-12-12 DIAGNOSIS — C22.0 HCC (HEPATOCELLULAR CARCINOMA) (H): ICD-10-CM

## 2024-12-12 DIAGNOSIS — K74.60 LIVER CIRRHOSIS SECONDARY TO NASH (H): ICD-10-CM

## 2024-12-12 DIAGNOSIS — R93.2 ABNORMAL FINDINGS ON DIAGNOSTIC IMAGING OF LIVER AND BILIARY TRACT: ICD-10-CM

## 2024-12-12 DIAGNOSIS — R16.0 LIVER MASS: ICD-10-CM

## 2024-12-12 LAB
AFP SERPL-MCNC: 5.3 NG/ML
ALBUMIN SERPL BCG-MCNC: 3.2 G/DL (ref 3.5–5.2)
ALP SERPL-CCNC: 157 U/L (ref 40–150)
ALT SERPL W P-5'-P-CCNC: 20 U/L (ref 0–70)
ANION GAP SERPL CALCULATED.3IONS-SCNC: 8 MMOL/L (ref 7–15)
AST SERPL W P-5'-P-CCNC: 45 U/L (ref 0–45)
BILIRUB DIRECT SERPL-MCNC: 0.6 MG/DL (ref 0–0.3)
BILIRUB SERPL-MCNC: 1.3 MG/DL
BUN SERPL-MCNC: 17.7 MG/DL (ref 8–23)
CALCIUM SERPL-MCNC: 8.7 MG/DL (ref 8.8–10.4)
CHLORIDE SERPL-SCNC: 103 MMOL/L (ref 98–107)
CREAT SERPL-MCNC: 0.88 MG/DL (ref 0.67–1.17)
EGFRCR SERPLBLD CKD-EPI 2021: >90 ML/MIN/1.73M2
ERYTHROCYTE [DISTWIDTH] IN BLOOD BY AUTOMATED COUNT: 12.6 % (ref 10–15)
GLUCOSE SERPL-MCNC: 127 MG/DL (ref 70–99)
HCO3 SERPL-SCNC: 25 MMOL/L (ref 22–29)
HCT VFR BLD AUTO: 35.6 % (ref 40–53)
HGB BLD-MCNC: 12.6 G/DL (ref 13.3–17.7)
INR PPP: 1.1 (ref 0.85–1.15)
MCH RBC QN AUTO: 34.1 PG (ref 26.5–33)
MCHC RBC AUTO-ENTMCNC: 35.4 G/DL (ref 31.5–36.5)
MCV RBC AUTO: 97 FL (ref 78–100)
PLATELET # BLD AUTO: 89 10E3/UL (ref 150–450)
POTASSIUM SERPL-SCNC: 4.3 MMOL/L (ref 3.4–5.3)
PROT SERPL-MCNC: 6.7 G/DL (ref 6.4–8.3)
RBC # BLD AUTO: 3.69 10E6/UL (ref 4.4–5.9)
SODIUM SERPL-SCNC: 136 MMOL/L (ref 135–145)
WBC # BLD AUTO: 3.9 10E3/UL (ref 4–11)

## 2024-12-12 PROCEDURE — 82248 BILIRUBIN DIRECT: CPT | Performed by: PATHOLOGY

## 2024-12-12 PROCEDURE — 36415 COLL VENOUS BLD VENIPUNCTURE: CPT | Performed by: PATHOLOGY

## 2024-12-12 PROCEDURE — 85027 COMPLETE CBC AUTOMATED: CPT | Performed by: PATHOLOGY

## 2024-12-12 PROCEDURE — 74183 MRI ABD W/O CNTR FLWD CNTR: CPT | Mod: MG | Performed by: RADIOLOGY

## 2024-12-12 PROCEDURE — A9585 GADOBUTROL INJECTION: HCPCS | Performed by: RADIOLOGY

## 2024-12-12 PROCEDURE — 85610 PROTHROMBIN TIME: CPT | Performed by: PATHOLOGY

## 2024-12-12 PROCEDURE — 99000 SPECIMEN HANDLING OFFICE-LAB: CPT | Performed by: PATHOLOGY

## 2024-12-12 PROCEDURE — G1010 CDSM STANSON: HCPCS | Performed by: RADIOLOGY

## 2024-12-12 PROCEDURE — 80053 COMPREHEN METABOLIC PANEL: CPT | Performed by: PATHOLOGY

## 2024-12-12 PROCEDURE — 82105 ALPHA-FETOPROTEIN SERUM: CPT | Performed by: INTERNAL MEDICINE

## 2024-12-12 RX ORDER — GADOBUTROL 604.72 MG/ML
15 INJECTION INTRAVENOUS ONCE
Status: COMPLETED | OUTPATIENT
Start: 2024-12-12 | End: 2024-12-12

## 2024-12-12 RX ADMIN — GADOBUTROL 12 ML: 604.72 INJECTION INTRAVENOUS at 07:17

## 2024-12-12 NOTE — DISCHARGE INSTRUCTIONS
MRI Contrast Discharge Instructions    The IV contrast you received today will pass out of your body in your  urine. This will happen in the next 24 hours. You will not feel this process.  Your urine will not change color.    Drink at least 4 extra glasses of water or juice today (unless your doctor  has restricted your fluids). This reduces the stress on your kidneys.  You may take your regular medicines.    If you are on dialysis: It is best to have dialysis today.    If you have a reaction: Most reactions happen right away. If you have  any new symptoms after leaving the hospital (such as hives or swelling),  call your hospital at the correct number below. Or call your family doctor.  If you have breathing distress or wheezing, call 911.    Special instructions: ***    I have read and understand the above information.    Signature:______________________________________ Date:___________    Staff:__________________________________________ Date:___________     Time:__________    [place] [facility] Departments:    ___[place]: [phone]  ___[facility]: [phone]  ___[place]: [phone] ___[facility]: [phone]  ___[facility]: [phone]  ___[facility]: [phone]  ___[phone]  ___[facility]: [phone]  ___[phone]

## 2024-12-16 NOTE — PROGRESS NOTES
[facility] Hepatology    Assessment  74 year old  with PMHx of compensated MASLD cirrhosis, HCC s/p TARE (3/2024), HTN, type II diabetes and class III obesity.     #. MASLD Cirrhosis, compensated  #. HCC s/p Y-90/TARE (3/2024)  MELD 3.0: 9    Patient with compensated cirrhosis related to metabolic associated steatotic liver disease.  He has multiple metabolic risk factors including hypertension, type 2 diabetes and ongoing obesity with BMI of 38.  He has lower extremity edema but no abdominal swelling or hepatic encephalopathy. He is due for variceal screening.     With regards to the patient's hepatocellular carcinoma he underwent Y90/transarterial radioembolization in March 2024.  Recent MRI without any evidence of active or recurrent liver cancer.  AFP within normal limits.  Given the risk of recurrence is the highest it is within the first year plan for repeat imaging in 3 months.  After 1 year can consider spacing out to every 6 months.    Plan  -- Okay to continue spironolactone; can stop lasix   * In 3 months, if no issues with ascites or lower extremity edema can stop spironolactone  -- MRI + AFP every 3 months: due 3/2025  -- Follow-up with primary care doctor regarding metabolic syndrome management.  -- Lifestyle and dietary changes with the goal of 7-10% weight loss   * Limit portion sizes and exclude MASLD promoting components: carbohydrates, sugars, processed food, foods & beverages high in added fructose   * Recommend aerobic exercise and resistance training at least three days a week with gradual increase over time  * If inadequate response to lifestyle therapies and ongoing BMI >27 (with comorbid conditions) or BMI > 30, recommend weight loss medication initiation, such as semaglutide or liraglutide  -- Given diabetes no contraindication to statin initiation given his risk of coronary artery disease.  -- Variceal Screening next due 12/2024; patient interested in getting this done at  [facility]    RTC: 3 months    Gabriela Borrego MD (Lizzie)  Advanced & Transplant Hepatology  [facility]    I spent 40 minutes on this encounter performing the following: reviewing the patient's medical record (clinic visits, hospital records, lab results, imaging and procedural documentation), history taking, physical exam and documentation on the date of the encounter. I also spent part of the time in coordination of care and counseling.    HPI:  MASLD Cirrhosis  - no hx HE  - no hx ascites  - no hx variceal bleed  - last EGD 12/2022 - no varices    Liver Lesions  - 1/2024: LR-4 lesion (2.4cm) in seg 4A + LR-3 lesion (1.1 cm) in seg 8  - 3/4/2024: Y-90/TARE to segment 4.     Patient presented with his wife.    He reports doing generally well without any significant acute concerns or complaints.  He recently had cellulitis on his left leg and required 2 different antibiotic courses.  Because of associated swelling in his lower extremities he was started on Lasix and spironolactone at 40 mg/day and 25 mg/day respectively.  On these medications he has very minimal lower extremity edema on the left and none on the right    He has no issues with slowness of thought or confusion.  No issues with gum bleeding, mouth bleeding, hematemesis, epistaxis, melena or hematochezia.    With regards to his metabolic syndrome management -he does not engage in much exercise or activity.  He walks 3-4k steps per day.  He reports that he is trying to eat better but his wife was present during the visit alludes to some issues with adherence to a low carbohydrate, low sugar and low processed food diet. He is off of his metformin because of better glucose control.  Every other week he travels outside of the [facility] and it is harder to adhere to his diet     Medical hx Surgical hx   Past Medical History:   Diagnosis Date    HTN (hypertension)     MASLD Cirrhosis     Obesity     Type 2 diabetes mellitus (H)        Past Surgical History:   Procedure Laterality Date    IR SIRT (SELECTIVE INTERNAL RADIO THERAPY)  2/20/2024    IR VISCERAL ANGIOGRAM  2/20/2024    IR VISCERAL EMBOLIZATION  3/4/2024    Robotic assisted laparoscopic bilateral inguinal hernia repair with mesh      2/2023    TONSILLECTOMY            Medications  Current Outpatient Medications   Medication Sig Dispense Refill    Ascorbic Acid (VITAMIN C PO) Take 1 tablet by mouth daily      DULERA 100-5 MCG/ACT inhaler PLEASE SEE ATTACHED FOR DETAILED DIRECTIONS      EPINEPHrine (ANY BX GENERIC EQUIV) 0.3 MG/0.3ML injection 2-pack Inject 0.3 mg into the muscle as needed      finasteride (PROSCAR) 5 MG tablet Take 5 mg by mouth daily      fluticasone (FLONASE) 50 MCG/ACT nasal spray Spray 2 sprays into both nostrils daily as needed      hydroxychloroquine (PLAQUENIL) 200 MG tablet Take 200 mg by mouth 2 times daily.      irbesartan (AVAPRO) 150 MG tablet Take 150 mg by mouth daily      Multiple Vitamin (MULTIVITAMIN PO) Take 1 tablet by mouth daily      rosuvastatin (CRESTOR) 5 MG tablet Take 5 mg by mouth daily.      spironolactone (ALDACTONE) 25 MG tablet Take 1 tablet by mouth daily.      tamsulosin (FLOMAX) 0.4 MG capsule Take 0.4 mg by mouth daily.      metFORMIN (GLUCOPHAGE) 500 MG tablet Take 500 mg by mouth daily (with breakfast)         Allergies  Allergies   Allergen Reactions    Tomato Anaphylaxis    Cephalexin Rash    Tetanus Toxoids Other (See Comments)     105 degree fever, swelling at site    Acyclovir Other (See Comments)     irritable, fragoso.    Ciprofloxacin Nausea and Vomiting       Family hx Social hx   Maternal grandfather may have had cirrhosis Social History     Tobacco Use    Smoking status: Former     Types: Cigarettes    Smokeless tobacco: Never    Tobacco comments:     Smoked in high school. Has not smoked in 50+ years   Substance Use Topics    Alcohol use: Not Currently    Drug use: Not Currently     - Employment: works as a   -  "Lives with Radha (wife). Several children + 5 grandchildren  - Alcohol: Denies  - Tobacco: Former, none in > 50 years     Review of systems  A 10-point review of systems was negative.    Examination  /69 (BP Location: Right arm, Patient Position: Sitting, Cuff Size: Adult Large)   Pulse 72   Temp 97.8  F (36.6  C) (Oral)   Resp 18   Ht 1.74 m (5' 8.5\")   Wt 117.1 kg (258 lb 3.2 oz)   SpO2 97%   BMI 38.68 kg/m     Body mass index is 38.68 kg/m .    Gen-NAD  Eye-no conjunctival icterus  ENT- MMM  CVS- RRR, no murmurs  RS- CTA bilaterally  Abd-obese, soft, nontender.  Extr- 2+ radial pulses bilaterally, no lower extremity edema bilaterally  MS- hands without clubbing  Skin- no jaundice  Psych- normal mood    Laboratory  BMP  Recent Labs   Lab Test 12/12/24 0627 07/01/24 0633 04/03/24  0644 03/04/24  0725    138 139 135   POTASSIUM 4.3 4.4 4.2 4.1   CHLORIDE 103 105 104 101   KIMBERLY 8.7* 8.8 8.3* 8.7*   CO2 25 26 26 25   BUN 17.7 12.9 8.9 12.1   CR 0.88 0.74 0.75 0.84   * 105* 132* 122*     CBC  Recent Labs   Lab Test 12/12/24 0627 07/01/24 0633 04/03/24  0644 03/04/24  0725   WBC 3.9* 3.7* 2.8* 3.9*   RBC 3.69* 3.79* 3.86* 3.87*   HGB 12.6* 13.1* 12.9* 13.1*   HCT 35.6* 37.3* 37.0* 37.1*   MCV 97 98 96 96   MCH 34.1* 34.6* 33.4* 33.9*   MCHC 35.4 35.1 34.9 35.3   RDW 12.6 13.1 13.0 12.8   PLT 89* 96* 82* 102*     Liver Enzymes   Recent Labs   Lab Test 12/12/24 0627   PROTTOTAL 6.7   ALBUMIN 3.2*   BILITOTAL 1.3*   ALKPHOS 157*   AST 45   ALT 20      INR   INR   Date Value Ref Range Status   12/12/2024 1.10 0.85 - 1.15 Final         Ceruloplasmin: 23  Hep B s Ag non-reactive  Hep B s Ab non-reactive  Hep B c Ab non-reactive  Hep  C Ab negative     Radiology  CT Chest 1/10/2024  1. No CT findings to suggest metastatic disease in the chest.    MRI abdomen with and without contrast 12/12/2024  1. Cirrhosis and evidence of portal hypertension.  2. Post radioembolization changes in hepatic segment " 4A with decreased  enhancement at the treatment site. LR-TR nonprogressing.  3. Stable LR-3 lesions. No new suspicious lesion.  4. Based on this exam only, the patient is within [place] criteria.  5. Stable cystic focus in the body of the pancreas without worrisome  features, likely representing a sidebranch IPMN.     Endoscopy  EGD 12/2022  - LA Grade A reflux esophagitis with no bleeding.  - Z-line, 41 cm from the incisors.  - Mild Gastritis. Biopsied for h.pylori.   - Normal examined duodenum.   - The examination was otherwise normal.

## 2024-12-18 ENCOUNTER — OFFICE VISIT (OUTPATIENT)
Dept: GASTROENTEROLOGY | Facility: CLINIC | Age: 74
End: 2024-12-18
Attending: INTERNAL MEDICINE
Payer: MEDICARE

## 2024-12-18 VITALS
OXYGEN SATURATION: 97 % | BODY MASS INDEX: 38.24 KG/M2 | DIASTOLIC BLOOD PRESSURE: 69 MMHG | SYSTOLIC BLOOD PRESSURE: 110 MMHG | HEIGHT: 69 IN | RESPIRATION RATE: 18 BRPM | WEIGHT: 258.2 LBS | HEART RATE: 72 BPM | TEMPERATURE: 97.8 F

## 2024-12-18 DIAGNOSIS — K76.0 NAFLD (NONALCOHOLIC FATTY LIVER DISEASE): Primary | ICD-10-CM

## 2024-12-18 DIAGNOSIS — R93.2 ABNORMAL FINDINGS ON DIAGNOSTIC IMAGING OF LIVER AND BILIARY TRACT: ICD-10-CM

## 2024-12-18 PROCEDURE — 99215 OFFICE O/P EST HI 40 MIN: CPT | Performed by: INTERNAL MEDICINE

## 2024-12-18 PROCEDURE — G0463 HOSPITAL OUTPT CLINIC VISIT: HCPCS | Performed by: INTERNAL MEDICINE

## 2024-12-18 RX ORDER — SPIRONOLACTONE 25 MG/1
1 TABLET ORAL DAILY
COMMUNITY
Start: 2024-11-13 | End: 2025-11-13

## 2024-12-18 RX ORDER — TAMSULOSIN HYDROCHLORIDE 0.4 MG/1
0.4 CAPSULE ORAL DAILY
COMMUNITY
Start: 2024-11-13

## 2024-12-18 RX ORDER — FUROSEMIDE 40 MG/1
40 TABLET ORAL DAILY
COMMUNITY
Start: 2024-11-25 | End: 2024-12-18

## 2024-12-18 RX ORDER — ROSUVASTATIN CALCIUM 5 MG/1
5 TABLET, COATED ORAL DAILY
COMMUNITY
Start: 2024-11-25 | End: 2025-11-25

## 2024-12-18 ASSESSMENT — PAIN SCALES - GENERAL: PAINLEVEL_OUTOF10: NO PAIN (0)

## 2024-12-18 NOTE — PATIENT INSTRUCTIONS
It was a pleasure taking care of you today.  I've included a brief summary of our discussion and care plan from today's visit below.  Please review this information with your primary care provider.  _______________________________________________________________________    My recommendations are summarized as follows:    - Okay to stop lasix/furosemide  - Continue spironolactone 25mg per day  - If you have issues with fluid retention, reach out to me  - Upper endoscopy at [facility]  - Continue to work on portion control, reducing: sugars, carbohydrates + processed foods  - Work on increasing your activity/mobility; goal 10k  - Labs and imaging in 3 months    Return to Liver/Hepatology Clinic in 3 months.    _______________________________________________________________________    Scheduling Procedures or Tests  - To schedule endoscopic procedures call: [phone]  - To schedule radiology tests call: [phone] (for [facility]) or [phone] (for [facility])   _______________________________________________________________________    Who do I call with any questions after my visit?  Please be in touch if there are any further questions that arise following today's visit.  There are multiple ways to contact your gastroenterology care team.      To schedule or reschedule an appointment, please call [phone] and choose option 2 (for hepatology) and then option 1 (for scheduling).    During business hours, you may reach a nurse by calling the appointment line ([phone]) and asking to speak with a nurse    You can send a secure message through [employer] for non-urgent questions. [employer] messages are answered by your nurse or doctor typically within 24 to 48 hours. Please allow extra time on weekends and holidays.      For urgent/emergent questions after business hours, you may reach the on-call GI Fellow by contacting the [facility]  at [phone].     How will I get the  results of any tests ordered?    - If you have signed up for MyChart, any tests ordered at your visit will be available to you after your physician reviews them.  Typically this takes 1-2 weeks.    - If you do not have MyChart, your results will either be mailed to you as a letter or via a telephone call.  - If there are urgent results that require a change in your care plan, your physician or nurse will call you to discuss the next steps.     What is [employer]?  [employer] is a secure way for you to access all of your healthcare records from the [facility].  It is a web based computer program, so you can sign on to it from any location.  It also allows you to send secure messages to your care team.  I recommend signing up for [employer] access if you have not already done so and are comfortable with using a computer.      How to I schedule a follow-up visit?  If you did not schedule a follow-up visit today, please call [phone] to schedule a follow-up office visit.     Sincerely,    Gabriela Borrego MD (Lizzie)   of Medicine  Advanced & Transplant Hepatology  [facility]

## 2024-12-18 NOTE — LETTER
12/18/2024      Stewart Cummings  [address] [address] [address]      Dear Colleague,    Thank you for referring your patient, Stewart Cummings, to the [facility]. Please see a copy of my visit note below.    [facility] Hepatology    Assessment  74 year old  with PMHx of compensated MASLD cirrhosis, HCC s/p TARE (3/2024), HTN, type II diabetes and class III obesity.     #. MASLD Cirrhosis, compensated  #. HCC s/p Y-90/TARE (3/2024)  MELD 3.0: 9    Patient with compensated cirrhosis related to metabolic associated steatotic liver disease.  He has multiple metabolic risk factors including hypertension, type 2 diabetes and ongoing obesity with BMI of 38.  He has lower extremity edema but no abdominal swelling or hepatic encephalopathy. He is due for variceal screening.     With regards to the patient's hepatocellular carcinoma he underwent Y90/transarterial radioembolization in March 2024.  Recent MRI without any evidence of active or recurrent liver cancer.  AFP within normal limits.  Given the risk of recurrence is the highest it is within the first year plan for repeat imaging in 3 months.  After 1 year can consider spacing out to every 6 months.    Plan  -- Okay to continue spironolactone; can stop lasix   * In 3 months, if no issues with ascites or lower extremity edema can stop spironolactone  -- MRI + AFP every 3 months: due 3/2025  -- Follow-up with primary care doctor regarding metabolic syndrome management.  -- Lifestyle and dietary changes with the goal of 7-10% weight loss   * Limit portion sizes and exclude MASLD promoting components: carbohydrates, sugars, processed food, foods & beverages high in added fructose   * Recommend aerobic exercise and resistance training at least three days a week with gradual increase over time  * If inadequate response to lifestyle therapies and ongoing BMI >27 (with comorbid conditions) or BMI > 30, recommend weight  loss medication initiation, such as semaglutide or liraglutide  -- Given diabetes no contraindication to statin initiation given his risk of coronary artery disease.  -- Variceal Screening next due 12/2024; patient interested in getting this done at [facility]    RTC: 3 months    Gabriela Borrego MD (Lizzie)  Advanced & Transplant Hepatology  [facility]    I spent 40 minutes on this encounter performing the following: reviewing the patient's medical record (clinic visits, hospital records, lab results, imaging and procedural documentation), history taking, physical exam and documentation on the date of the encounter. I also spent part of the time in coordination of care and counseling.    HPI:  MASLD Cirrhosis  - no hx HE  - no hx ascites  - no hx variceal bleed  - last EGD 12/2022 - no varices    Liver Lesions  - 1/2024: LR-4 lesion (2.4cm) in seg 4A + LR-3 lesion (1.1 cm) in seg 8  - 3/4/2024: Y-90/TARE to segment 4.     Patient presented with his wife.    He reports doing generally well without any significant acute concerns or complaints.  He recently had cellulitis on his left leg and required 2 different antibiotic courses.  Because of associated swelling in his lower extremities he was started on Lasix and spironolactone at 40 mg/day and 25 mg/day respectively.  On these medications he has very minimal lower extremity edema on the left and none on the right    He has no issues with slowness of thought or confusion.  No issues with gum bleeding, mouth bleeding, hematemesis, epistaxis, melena or hematochezia.    With regards to his metabolic syndrome management -he does not engage in much exercise or activity.  He walks 3-4k steps per day.  He reports that he is trying to eat better but his wife was present during the visit alludes to some issues with adherence to a low carbohydrate, low sugar and low processed food diet. He is off of his metformin because of better glucose control.   Every other week he travels outside of the [facility] and it is harder to adhere to his diet     Medical hx Surgical hx   Past Medical History:   Diagnosis Date     HTN (hypertension)      MASLD Cirrhosis      Obesity      Type 2 diabetes mellitus (H)       Past Surgical History:   Procedure Laterality Date     IR SIRT (SELECTIVE INTERNAL RADIO THERAPY)  2/20/2024     IR VISCERAL ANGIOGRAM  2/20/2024     IR VISCERAL EMBOLIZATION  3/4/2024     Robotic assisted laparoscopic bilateral inguinal hernia repair with mesh      2/2023     TONSILLECTOMY            Medications  Current Outpatient Medications   Medication Sig Dispense Refill     Ascorbic Acid (VITAMIN C PO) Take 1 tablet by mouth daily       DULERA 100-5 MCG/ACT inhaler PLEASE SEE ATTACHED FOR DETAILED DIRECTIONS       EPINEPHrine (ANY BX GENERIC EQUIV) 0.3 MG/0.3ML injection 2-pack Inject 0.3 mg into the muscle as needed       finasteride (PROSCAR) 5 MG tablet Take 5 mg by mouth daily       fluticasone (FLONASE) 50 MCG/ACT nasal spray Spray 2 sprays into both nostrils daily as needed       hydroxychloroquine (PLAQUENIL) 200 MG tablet Take 200 mg by mouth 2 times daily.       irbesartan (AVAPRO) 150 MG tablet Take 150 mg by mouth daily       Multiple Vitamin (MULTIVITAMIN PO) Take 1 tablet by mouth daily       rosuvastatin (CRESTOR) 5 MG tablet Take 5 mg by mouth daily.       spironolactone (ALDACTONE) 25 MG tablet Take 1 tablet by mouth daily.       tamsulosin (FLOMAX) 0.4 MG capsule Take 0.4 mg by mouth daily.       metFORMIN (GLUCOPHAGE) 500 MG tablet Take 500 mg by mouth daily (with breakfast)         Allergies  Allergies   Allergen Reactions     Tomato Anaphylaxis     Cephalexin Rash     Tetanus Toxoids Other (See Comments)     105 degree fever, swelling at site     Acyclovir Other (See Comments)     irritable, fragoso.     Ciprofloxacin Nausea and Vomiting       Family hx Social hx   Maternal grandfather may have had cirrhosis Social History     Tobacco Use      "Smoking status: Former     Types: Cigarettes     Smokeless tobacco: Never     Tobacco comments:     Smoked in high school. Has not smoked in 50+ years   Substance Use Topics     Alcohol use: Not Currently     Drug use: Not Currently     - Employment: works as a   - Lives with Radha (wife). Several children + 5 grandchildren  - Alcohol: Denies  - Tobacco: Former, none in > 50 years     Review of systems  A 10-point review of systems was negative.    Examination  /69 (BP Location: Right arm, Patient Position: Sitting, Cuff Size: Adult Large)   Pulse 72   Temp 97.8  F (36.6  C) (Oral)   Resp 18   Ht 1.74 m (5' 8.5\")   Wt 117.1 kg (258 lb 3.2 oz)   SpO2 97%   BMI 38.68 kg/m     Body mass index is 38.68 kg/m .    Gen-NAD  Eye-no conjunctival icterus  ENT- MMM  CVS- RRR, no murmurs  RS- CTA bilaterally  Abd-obese, soft, nontender.  Extr- 2+ radial pulses bilaterally, no lower extremity edema bilaterally  MS- hands without clubbing  Skin- no jaundice  Psych- normal mood    Laboratory  BMP  Recent Labs   Lab Test 12/12/24 0627 07/01/24 0633 04/03/24  0644 03/04/24  0725    138 139 135   POTASSIUM 4.3 4.4 4.2 4.1   CHLORIDE 103 105 104 101   KIMBERLY 8.7* 8.8 8.3* 8.7*   CO2 25 26 26 25   BUN 17.7 12.9 8.9 12.1   CR 0.88 0.74 0.75 0.84   * 105* 132* 122*     CBC  Recent Labs   Lab Test 12/12/24 0627 07/01/24 0633 04/03/24  0644 03/04/24  0725   WBC 3.9* 3.7* 2.8* 3.9*   RBC 3.69* 3.79* 3.86* 3.87*   HGB 12.6* 13.1* 12.9* 13.1*   HCT 35.6* 37.3* 37.0* 37.1*   MCV 97 98 96 96   MCH 34.1* 34.6* 33.4* 33.9*   MCHC 35.4 35.1 34.9 35.3   RDW 12.6 13.1 13.0 12.8   PLT 89* 96* 82* 102*     Liver Enzymes   Recent Labs   Lab Test 12/12/24  0627   PROTTOTAL 6.7   ALBUMIN 3.2*   BILITOTAL 1.3*   ALKPHOS 157*   AST 45   ALT 20      INR   INR   Date Value Ref Range Status   12/12/2024 1.10 0.85 - 1.15 Final         Ceruloplasmin: 23  Hep B s Ag non-reactive  Hep B s Ab non-reactive  Hep B c Ab " non-reactive  Hep  C Ab negative     Radiology  CT Chest 1/10/2024  1. No CT findings to suggest metastatic disease in the chest.    MRI abdomen with and without contrast 12/12/2024  1. Cirrhosis and evidence of portal hypertension.  2. Post radioembolization changes in hepatic segment 4A with decreased  enhancement at the treatment site. LR-TR nonprogressing.  3. Stable LR-3 lesions. No new suspicious lesion.  4. Based on this exam only, the patient is within [place] criteria.  5. Stable cystic focus in the body of the pancreas without worrisome  features, likely representing a sidebranch IPMN.     Endoscopy  EGD 12/2022  - LA Grade A reflux esophagitis with no bleeding.  - Z-line, 41 cm from the incisors.  - Mild Gastritis. Biopsied for h.pylori.   - Normal examined duodenum.   - The examination was otherwise normal.        Again, thank you for allowing me to participate in the care of your patient.        Sincerely,        Gabriela Borrego MD

## 2024-12-18 NOTE — NURSING NOTE
"Chief Complaint   Patient presents with    RECHECK     Cirrhosis      Vital signs:  Temp: 97.8  F (36.6  C) Temp src: Oral BP: 110/69 Pulse: 72   Resp: 18 SpO2: 97 %     Height: 174 cm (5' 8.5\") Weight: 117.1 kg (258 lb 3.2 oz)  Estimated body mass index is 38.68 kg/m  as calculated from the following:    Height as of this encounter: 1.74 m (5' 8.5\").    Weight as of this encounter: 117.1 kg (258 lb 3.2 oz).      Lana Cerrato, [facility]  12/18/2024 10:59 AM    "

## 2025-02-28 ENCOUNTER — LAB (OUTPATIENT)
Dept: LAB | Facility: CLINIC | Age: 75
End: 2025-02-28
Attending: INTERNAL MEDICINE
Payer: MEDICARE

## 2025-02-28 ENCOUNTER — ANCILLARY PROCEDURE (OUTPATIENT)
Dept: MRI IMAGING | Facility: CLINIC | Age: 75
End: 2025-02-28
Attending: INTERNAL MEDICINE
Payer: MEDICARE

## 2025-02-28 DIAGNOSIS — R16.0 LIVER MASS: ICD-10-CM

## 2025-02-28 DIAGNOSIS — K76.0 NAFLD (NONALCOHOLIC FATTY LIVER DISEASE): ICD-10-CM

## 2025-02-28 DIAGNOSIS — C22.0 HCC (HEPATOCELLULAR CARCINOMA) (H): ICD-10-CM

## 2025-02-28 LAB
AFP SERPL-MCNC: 4.1 NG/ML
ALBUMIN SERPL BCG-MCNC: 3.3 G/DL (ref 3.5–5.2)
ALP SERPL-CCNC: 159 U/L (ref 40–150)
ALT SERPL W P-5'-P-CCNC: 25 U/L (ref 0–70)
ANION GAP SERPL CALCULATED.3IONS-SCNC: 7 MMOL/L (ref 7–15)
AST SERPL W P-5'-P-CCNC: 51 U/L (ref 0–45)
BILIRUB DIRECT SERPL-MCNC: 0.62 MG/DL (ref 0–0.3)
BILIRUB SERPL-MCNC: 1.1 MG/DL
BUN SERPL-MCNC: 14.8 MG/DL (ref 8–23)
CALCIUM SERPL-MCNC: 8.5 MG/DL (ref 8.8–10.4)
CHLORIDE SERPL-SCNC: 103 MMOL/L (ref 98–107)
CREAT SERPL-MCNC: 0.95 MG/DL (ref 0.67–1.17)
EGFRCR SERPLBLD CKD-EPI 2021: 85 ML/MIN/1.73M2
GLUCOSE SERPL-MCNC: 121 MG/DL (ref 70–99)
HCO3 SERPL-SCNC: 25 MMOL/L (ref 22–29)
INR PPP: 1.05 (ref 0.85–1.15)
POTASSIUM SERPL-SCNC: 4.8 MMOL/L (ref 3.4–5.3)
PROT SERPL-MCNC: 6.3 G/DL (ref 6.4–8.3)
SODIUM SERPL-SCNC: 135 MMOL/L (ref 135–145)

## 2025-02-28 PROCEDURE — 74183 MRI ABD W/O CNTR FLWD CNTR: CPT | Mod: GC | Performed by: RADIOLOGY

## 2025-02-28 PROCEDURE — 36415 COLL VENOUS BLD VENIPUNCTURE: CPT | Performed by: PATHOLOGY

## 2025-02-28 PROCEDURE — 80053 COMPREHEN METABOLIC PANEL: CPT | Performed by: PATHOLOGY

## 2025-02-28 PROCEDURE — 82105 ALPHA-FETOPROTEIN SERUM: CPT | Mod: GA | Performed by: RADIOLOGY

## 2025-02-28 PROCEDURE — 85610 PROTHROMBIN TIME: CPT | Performed by: PATHOLOGY

## 2025-02-28 PROCEDURE — 82248 BILIRUBIN DIRECT: CPT | Performed by: PATHOLOGY

## 2025-02-28 PROCEDURE — 99000 SPECIMEN HANDLING OFFICE-LAB: CPT | Mod: GA | Performed by: PATHOLOGY

## 2025-02-28 PROCEDURE — A9585 GADOBUTROL INJECTION: HCPCS | Performed by: RADIOLOGY

## 2025-02-28 RX ORDER — GADOBUTROL 604.72 MG/ML
15 INJECTION INTRAVENOUS ONCE
Status: COMPLETED | OUTPATIENT
Start: 2025-02-28 | End: 2025-02-28

## 2025-02-28 RX ADMIN — GADOBUTROL 12 ML: 604.72 INJECTION INTRAVENOUS at 07:51

## 2025-03-24 DIAGNOSIS — R18.8 CIRRHOSIS OF LIVER WITH ASCITES, UNSPECIFIED HEPATIC CIRRHOSIS TYPE (H): Primary | ICD-10-CM

## 2025-03-24 DIAGNOSIS — K74.60 CIRRHOSIS OF LIVER WITH ASCITES, UNSPECIFIED HEPATIC CIRRHOSIS TYPE (H): Primary | ICD-10-CM

## 2025-03-24 RX ORDER — SPIRONOLACTONE 25 MG/1
25 TABLET ORAL DAILY
Qty: 90 TABLET | Refills: 3 | Status: SHIPPED | OUTPATIENT
Start: 2025-03-24

## 2025-06-15 ENCOUNTER — HEALTH MAINTENANCE LETTER (OUTPATIENT)
Age: 75
End: 2025-06-15

## (undated) RX ORDER — LIDOCAINE HYDROCHLORIDE 10 MG/ML
INJECTION, SOLUTION EPIDURAL; INFILTRATION; INTRACAUDAL; PERINEURAL
Status: DISPENSED
Start: 2024-02-20

## (undated) RX ORDER — SODIUM CHLORIDE 9 MG/ML
INJECTION, SOLUTION INTRAVENOUS
Status: DISPENSED
Start: 2024-03-04

## (undated) RX ORDER — SODIUM CHLORIDE 9 MG/ML
INJECTION, SOLUTION INTRAVENOUS
Status: DISPENSED
Start: 2024-02-20

## (undated) RX ORDER — ONDANSETRON 2 MG/ML
INJECTION INTRAMUSCULAR; INTRAVENOUS
Status: DISPENSED
Start: 2024-02-20

## (undated) RX ORDER — FENTANYL CITRATE 50 UG/ML
INJECTION, SOLUTION INTRAMUSCULAR; INTRAVENOUS
Status: DISPENSED
Start: 2024-02-20

## (undated) RX ORDER — LIDOCAINE HYDROCHLORIDE 10 MG/ML
INJECTION, SOLUTION EPIDURAL; INFILTRATION; INTRACAUDAL; PERINEURAL
Status: DISPENSED
Start: 2024-03-04

## (undated) RX ORDER — ONDANSETRON 2 MG/ML
INJECTION INTRAMUSCULAR; INTRAVENOUS
Status: DISPENSED
Start: 2024-03-04

## (undated) RX ORDER — FENTANYL CITRATE 50 UG/ML
INJECTION, SOLUTION INTRAMUSCULAR; INTRAVENOUS
Status: DISPENSED
Start: 2024-03-04

## (undated) RX ORDER — HEPARIN SODIUM 200 [USP'U]/100ML
INJECTION, SOLUTION INTRAVENOUS
Status: DISPENSED
Start: 2024-02-20

## (undated) RX ORDER — PANTOPRAZOLE SODIUM 40 MG/10ML
INJECTION, POWDER, LYOPHILIZED, FOR SOLUTION INTRAVENOUS
Status: DISPENSED
Start: 2024-03-04

## (undated) RX ORDER — DIPHENHYDRAMINE HYDROCHLORIDE 50 MG/ML
INJECTION INTRAMUSCULAR; INTRAVENOUS
Status: DISPENSED
Start: 2024-03-04